# Patient Record
Sex: MALE | Race: WHITE | URBAN - METROPOLITAN AREA
[De-identification: names, ages, dates, MRNs, and addresses within clinical notes are randomized per-mention and may not be internally consistent; named-entity substitution may affect disease eponyms.]

---

## 2019-03-15 ENCOUNTER — EMERGENCY (EMERGENCY)
Facility: HOSPITAL | Age: 54
LOS: 0 days | Discharge: HOME | End: 2019-03-15
Attending: EMERGENCY MEDICINE | Admitting: EMERGENCY MEDICINE
Payer: COMMERCIAL

## 2019-03-15 VITALS — WEIGHT: 259.93 LBS | HEIGHT: 70 IN

## 2019-03-15 VITALS
OXYGEN SATURATION: 99 % | RESPIRATION RATE: 19 BRPM | HEART RATE: 89 BPM | SYSTOLIC BLOOD PRESSURE: 143 MMHG | DIASTOLIC BLOOD PRESSURE: 84 MMHG | TEMPERATURE: 98 F

## 2019-03-15 DIAGNOSIS — Z87.891 PERSONAL HISTORY OF NICOTINE DEPENDENCE: ICD-10-CM

## 2019-03-15 DIAGNOSIS — Z80.0 FAMILY HISTORY OF MALIGNANT NEOPLASM OF DIGESTIVE ORGANS: ICD-10-CM

## 2019-03-15 DIAGNOSIS — R10.12 LEFT UPPER QUADRANT PAIN: ICD-10-CM

## 2019-03-15 DIAGNOSIS — K56.609 UNSPECIFIED INTESTINAL OBSTRUCTION, UNSPECIFIED AS TO PARTIAL VERSUS COMPLETE OBSTRUCTION: ICD-10-CM

## 2019-03-15 DIAGNOSIS — Z79.84 LONG TERM (CURRENT) USE OF ORAL HYPOGLYCEMIC DRUGS: ICD-10-CM

## 2019-03-15 DIAGNOSIS — K57.90 DIVERTICULOSIS OF INTESTINE, PART UNSPECIFIED, WITHOUT PERFORATION OR ABSCESS WITHOUT BLEEDING: ICD-10-CM

## 2019-03-15 DIAGNOSIS — E11.9 TYPE 2 DIABETES MELLITUS WITHOUT COMPLICATIONS: ICD-10-CM

## 2019-03-15 LAB
ALBUMIN SERPL ELPH-MCNC: 3.9 G/DL — SIGNIFICANT CHANGE UP (ref 3.3–5)
ALP SERPL-CCNC: 76 U/L — SIGNIFICANT CHANGE UP (ref 40–120)
ALT FLD-CCNC: 44 U/L — SIGNIFICANT CHANGE UP (ref 12–78)
ANION GAP SERPL CALC-SCNC: 8 MMOL/L — SIGNIFICANT CHANGE UP (ref 5–17)
APTT BLD: 30.1 SEC — SIGNIFICANT CHANGE UP (ref 27.5–36.3)
AST SERPL-CCNC: 28 U/L — SIGNIFICANT CHANGE UP (ref 15–37)
BASOPHILS # BLD AUTO: 0.03 K/UL — SIGNIFICANT CHANGE UP (ref 0–0.2)
BASOPHILS NFR BLD AUTO: 0.3 % — SIGNIFICANT CHANGE UP (ref 0–2)
BILIRUB SERPL-MCNC: 0.9 MG/DL — SIGNIFICANT CHANGE UP (ref 0.2–1.2)
BUN SERPL-MCNC: 12 MG/DL — SIGNIFICANT CHANGE UP (ref 7–23)
CALCIUM SERPL-MCNC: 9.2 MG/DL — SIGNIFICANT CHANGE UP (ref 8.5–10.1)
CHLORIDE SERPL-SCNC: 107 MMOL/L — SIGNIFICANT CHANGE UP (ref 96–108)
CO2 SERPL-SCNC: 21 MMOL/L — LOW (ref 22–31)
CREAT SERPL-MCNC: 1.03 MG/DL — SIGNIFICANT CHANGE UP (ref 0.5–1.3)
EOSINOPHIL # BLD AUTO: 0.04 K/UL — SIGNIFICANT CHANGE UP (ref 0–0.5)
EOSINOPHIL NFR BLD AUTO: 0.3 % — SIGNIFICANT CHANGE UP (ref 0–6)
GLUCOSE SERPL-MCNC: 133 MG/DL — HIGH (ref 70–99)
HCT VFR BLD CALC: 46.8 % — SIGNIFICANT CHANGE UP (ref 39–50)
HGB BLD-MCNC: 16.6 G/DL — SIGNIFICANT CHANGE UP (ref 13–17)
IMM GRANULOCYTES NFR BLD AUTO: 0.3 % — SIGNIFICANT CHANGE UP (ref 0–1.5)
INR BLD: 1.06 RATIO — SIGNIFICANT CHANGE UP (ref 0.88–1.16)
LIDOCAIN IGE QN: 163 U/L — SIGNIFICANT CHANGE UP (ref 73–393)
LYMPHOCYTES # BLD AUTO: 2.79 K/UL — SIGNIFICANT CHANGE UP (ref 1–3.3)
LYMPHOCYTES # BLD AUTO: 24.2 % — SIGNIFICANT CHANGE UP (ref 13–44)
MCHC RBC-ENTMCNC: 30.2 PG — SIGNIFICANT CHANGE UP (ref 27–34)
MCHC RBC-ENTMCNC: 35.5 GM/DL — SIGNIFICANT CHANGE UP (ref 32–36)
MCV RBC AUTO: 85.2 FL — SIGNIFICANT CHANGE UP (ref 80–100)
MONOCYTES # BLD AUTO: 0.98 K/UL — HIGH (ref 0–0.9)
MONOCYTES NFR BLD AUTO: 8.5 % — SIGNIFICANT CHANGE UP (ref 2–14)
NEUTROPHILS # BLD AUTO: 7.67 K/UL — HIGH (ref 1.8–7.4)
NEUTROPHILS NFR BLD AUTO: 66.4 % — SIGNIFICANT CHANGE UP (ref 43–77)
NRBC # BLD: 0 /100 WBCS — SIGNIFICANT CHANGE UP (ref 0–0)
PLATELET # BLD AUTO: 321 K/UL — SIGNIFICANT CHANGE UP (ref 150–400)
POTASSIUM SERPL-MCNC: 4.1 MMOL/L — SIGNIFICANT CHANGE UP (ref 3.5–5.3)
POTASSIUM SERPL-SCNC: 4.1 MMOL/L — SIGNIFICANT CHANGE UP (ref 3.5–5.3)
PROT SERPL-MCNC: 8.1 GM/DL — SIGNIFICANT CHANGE UP (ref 6–8.3)
PROTHROM AB SERPL-ACNC: 11.8 SEC — SIGNIFICANT CHANGE UP (ref 10–12.9)
RBC # BLD: 5.49 M/UL — SIGNIFICANT CHANGE UP (ref 4.2–5.8)
RBC # FLD: 12 % — SIGNIFICANT CHANGE UP (ref 10.3–14.5)
SODIUM SERPL-SCNC: 136 MMOL/L — SIGNIFICANT CHANGE UP (ref 135–145)
WBC # BLD: 11.55 K/UL — HIGH (ref 3.8–10.5)
WBC # FLD AUTO: 11.55 K/UL — HIGH (ref 3.8–10.5)

## 2019-03-15 PROCEDURE — 74177 CT ABD & PELVIS W/CONTRAST: CPT | Mod: 26

## 2019-03-15 PROCEDURE — 99285 EMERGENCY DEPT VISIT HI MDM: CPT

## 2019-03-15 PROCEDURE — 99282 EMERGENCY DEPT VISIT SF MDM: CPT

## 2019-03-15 RX ORDER — FAMOTIDINE 10 MG/ML
20 INJECTION INTRAVENOUS ONCE
Qty: 0 | Refills: 0 | Status: COMPLETED | OUTPATIENT
Start: 2019-03-15 | End: 2019-03-15

## 2019-03-15 RX ORDER — SODIUM CHLORIDE 9 MG/ML
1000 INJECTION INTRAMUSCULAR; INTRAVENOUS; SUBCUTANEOUS ONCE
Qty: 0 | Refills: 0 | Status: COMPLETED | OUTPATIENT
Start: 2019-03-15 | End: 2019-03-15

## 2019-03-15 RX ADMIN — SODIUM CHLORIDE 1000 MILLILITER(S): 9 INJECTION INTRAMUSCULAR; INTRAVENOUS; SUBCUTANEOUS at 18:04

## 2019-03-15 RX ADMIN — FAMOTIDINE 20 MILLIGRAM(S): 10 INJECTION INTRAVENOUS at 18:04

## 2019-03-15 NOTE — ED ADULT NURSE NOTE - NSIMPLEMENTINTERV_GEN_ALL_ED
Implemented All Universal Safety Interventions:  [place] to call system. Call bell, personal items and telephone within reach. Instruct patient to call for assistance. Room bathroom lighting operational. Non-slip footwear when patient is off stretcher. Physically safe environment: no spills, clutter or unnecessary equipment. Stretcher in lowest position, wheels locked, appropriate side rails in place.

## 2019-03-15 NOTE — ED STATDOCS - PROGRESS NOTE DETAILS
52 yo male with a PMH of DM presents with epigastric,LUQ pain since midnight last night, has been constant and causing difficulty sleeping last night. Now intermittent sharp pain. Was seen at urgent care, was told that he had a fast heart rate and to come to the ER. Nothing that alleviates or aggravates the pain. LBM was this morning. Labs, CT, meds, reeval. -Manuel Anne PA-C

## 2019-03-15 NOTE — ED STATDOCS - CLINICAL SUMMARY MEDICAL DECISION MAKING FREE TEXT BOX
Ileus vs partial SBO on CT scan.  Labs WNL.  Discussed with surgery Dr. Marquez.  Surgery team offered NG tube, admission, however patient electing to go home instead and try liquid diet, conservative management, with strict return precautions, which surgery team is okay with at this point given reassuring exam (currently no pain, patient tolerating PO, and had BM today).  Okay for d/c home, strict return precautions given.

## 2019-03-15 NOTE — ED STATDOCS - OBJECTIVE STATEMENT
54 y/o male with a PMHx of DM on Metformin (has not taken his Metformin for 2 weeks), s/p abd hernia surgery presents to the ED c/o sudden onset of LUQ abd pain at midnight last night. Pt reports pain was "sustained" last night, but has since become intermittent. When pain occurs, it is described as "sharp." +Diaphoresis last night, that has since resolved. Pt with x3 unsubstantial BMs from midnight last night to 8:00 this morning. Last BM was this morning. Denies fever, nausea, vomiting, diarrhea. Evaluated by urgent care PTA today and sent after EKG revealed pt was tachycardic. Pt reports recent 10 lb. weight loss. Has been eating more vegetables lately with a decrease in BMs. Former smoker (Quit 01-). Occasional EtOH.

## 2019-03-15 NOTE — ED ADULT NURSE NOTE - OBJECTIVE STATEMENT
c/o constant upper abdominal pain started 12 am, described as sharp pain, did not take medication for pain, resolved no its own, c/o diaphoresis at time, denies fever, vomiting, diarrhea, nausea HX: DM type 2, HTN, high cholesterol,

## 2019-03-15 NOTE — ED STATDOCS - CARE PLAN
Principal Discharge DX:	Abdominal pain Principal Discharge DX:	Abdominal pain  Secondary Diagnosis:	Partial small bowel obstruction

## 2019-03-15 NOTE — CONSULT NOTE ADULT - SUBJECTIVE AND OBJECTIVE BOX
53M with PMH of DM2 who presents with abdominal pain that was acute in onset last night.  Patient admits pain located mostly in epigastrium and now has completely resolved.  Admits 3 bowel movements since episode of acute pain, small.  Denies nausea, vomiting, fevers, chills.  Patient denies having similar pain in the past.  Patient denies colonoscopy in the past.  Patient admits father diagnosed with colon cancer in 80's.    PMH:  DM2  PSH:  Umbilical hernia repair (10 years ago at Outside hospital)  NKDA    PE:  ICU Vital Signs Last 24 Hrs  T(C): 36.8 (15 Mar 2019 18:42), Max: 36.8 (15 Mar 2019 16:39)  T(F): 98.2 (15 Mar 2019 18:42), Max: 98.3 (15 Mar 2019 16:39)  HR: 89 (15 Mar 2019 18:42) (89 - 106)  BP: 143/84 (15 Mar 2019 18:42) (138/99 - 143/84)  BP(mean): --  ABP: --  ABP(mean): --  RR: 19 (15 Mar 2019 18:42) (19 - 20)  SpO2: 99% (15 Mar 2019 18:42) (98% - 99%)    Gen:  NAD, AOX3, Obese abdomen  CV:  s1 s2s2  Pulm:  CTAB  Abd:  Soft, large obese abdomen, nontender, nondistended, well healed transverse supraumbilical scar  Ext:  nonder                          16.6   11.55 )-----------( 321      ( 15 Mar 2019 17:34 )             46.8   03-15    136  |  107  |  12  ----------------------------<  133<H>  4.1   |  21<L>  |  1.03    Ca    9.2      15 Mar 2019 17:34    TPro  8.1  /  Alb  3.9  /  TBili  0.9  /  DBili  x   /  AST  28  /  ALT  44  /  AlkPhos  76  03-15    Impression:    Ileus versus partial low-grade small bowel obstruction as discussed.  Diverticulosis coli without diverticulitis  Small indeterminate hypervascular focus right hepatic lobe. Correlate   with MR

## 2019-03-15 NOTE — PROGRESS NOTE ADULT - ASSESSMENT
H/o abdominal surgeries in the past possible ileus VS SBO , pain resolved, Pt offered observation   pt declined to stay, would like to go home, will report back to ER if symptoms return.

## 2019-03-15 NOTE — ED ADULT NURSE REASSESSMENT NOTE - NS ED NURSE REASSESS COMMENT FT1
as per md jackson- pt ok to go home, ng tube not necessary. pt comfortably, denies pain. ambulatory.
Pt updated on POC and blood test results. Pt given warm blanket. Hourly rounding completed, rn to continue to monitor.

## 2019-03-15 NOTE — PROGRESS NOTE ADULT - SUBJECTIVE AND OBJECTIVE BOX
Pt with abdominal pain now resolved, HD stable  abdominal exam benign, was evaluated by surgical resident was offered observation for SBO VS ileus, I reviewed all labs, and radiological studies  Labs:                          16.6   11.55 )-----------( 321      ( 15 Mar 2019 17:34 )             46.8       03-15    136  |  107  |  12  ----------------------------<  133<H>  4.1   |  21<L>  |  1.03    Ca    9.2      15 Mar 2019 17:34    TPro  8.1  /  Alb  3.9  /  TBili  0.9  /  DBili  x   /  AST  28  /  ALT  44  /  AlkPhos  76  03-15      PT/INR - ( 15 Mar 2019 17:34 )   PT: 11.8 sec;   INR: 1.06 ratio         PTT - ( 15 Mar 2019 17:34 )  PTT:30.1 sec    Vital Signs Last 24 Hrs  T(C): 36.8 (15 Mar 2019 18:42), Max: 36.8 (15 Mar 2019 16:39)  T(F): 98.2 (15 Mar 2019 18:42), Max: 98.3 (15 Mar 2019 16:39)  HR: 89 (15 Mar 2019 18:42) (89 - 106)  BP: 143/84 (15 Mar 2019 18:42) (138/99 - 143/84)  BP(mean): --  RR: 19 (15 Mar 2019 18:42) (19 - 20)  SpO2: 99% (15 Mar 2019 18:42) (98% - 99%)    < from: CT Abdomen and Pelvis w/ IV Cont (03.15.19 @ 20:29) >  Impression:    Ileus versus partial low-grade small bowel obstruction as discussed.  Diverticulosis coli without diverticulitis  Small indeterminate hypervascular focus right hepatic lobe. Correlate   with MR        < end of copied text >

## 2019-03-15 NOTE — CONSULT NOTE ADULT - ASSESSMENT
A/P:  53M with Partial SBO, resolving with benign abdominal exam.  -No acute surgical intervnetion  - NGT decompression, conservative management offered, patient elected to go home, proceed with liquid diet at first and agreed to return to the ER immediately upon return of abdominal pain or obstructive symptoms.      will discuss with attending, Dr. Marquez.

## 2019-05-12 ENCOUNTER — EMERGENCY (EMERGENCY)
Facility: HOSPITAL | Age: 54
LOS: 1 days | Discharge: HOME | End: 2019-05-12
Attending: EMERGENCY MEDICINE
Payer: COMMERCIAL

## 2019-05-12 VITALS
SYSTOLIC BLOOD PRESSURE: 137 MMHG | OXYGEN SATURATION: 98 % | RESPIRATION RATE: 18 BRPM | HEART RATE: 61 BPM | DIASTOLIC BLOOD PRESSURE: 87 MMHG

## 2019-05-12 VITALS
TEMPERATURE: 97 F | WEIGHT: 244.93 LBS | SYSTOLIC BLOOD PRESSURE: 131 MMHG | OXYGEN SATURATION: 97 % | DIASTOLIC BLOOD PRESSURE: 85 MMHG | HEIGHT: 70.5 IN | HEART RATE: 80 BPM | RESPIRATION RATE: 18 BRPM

## 2019-05-12 LAB
ALBUMIN SERPL ELPH-MCNC: 4.3 G/DL — SIGNIFICANT CHANGE UP (ref 3.3–5)
ALP SERPL-CCNC: 79 U/L — SIGNIFICANT CHANGE UP (ref 40–120)
ALT FLD-CCNC: 17 U/L — SIGNIFICANT CHANGE UP (ref 10–45)
ANION GAP SERPL CALC-SCNC: 14 MMOL/L — SIGNIFICANT CHANGE UP (ref 5–17)
APPEARANCE UR: CLEAR — SIGNIFICANT CHANGE UP
AST SERPL-CCNC: 13 U/L — SIGNIFICANT CHANGE UP (ref 10–40)
BACTERIA # UR AUTO: 0 — SIGNIFICANT CHANGE UP
BASE EXCESS BLDV CALC-SCNC: 2.2 MMOL/L — HIGH (ref -2–2)
BASOPHILS # BLD AUTO: 0 K/UL — SIGNIFICANT CHANGE UP (ref 0–0.2)
BASOPHILS NFR BLD AUTO: 0.3 % — SIGNIFICANT CHANGE UP (ref 0–2)
BILIRUB SERPL-MCNC: 0.7 MG/DL — SIGNIFICANT CHANGE UP (ref 0.2–1.2)
BILIRUB UR-MCNC: NEGATIVE — SIGNIFICANT CHANGE UP
BUN SERPL-MCNC: 16 MG/DL — SIGNIFICANT CHANGE UP (ref 7–23)
CA-I SERPL-SCNC: 1.19 MMOL/L — SIGNIFICANT CHANGE UP (ref 1.12–1.3)
CALCIUM SERPL-MCNC: 9.7 MG/DL — SIGNIFICANT CHANGE UP (ref 8.4–10.5)
CHLORIDE BLDV-SCNC: 109 MMOL/L — HIGH (ref 96–108)
CHLORIDE SERPL-SCNC: 102 MMOL/L — SIGNIFICANT CHANGE UP (ref 96–108)
CO2 BLDV-SCNC: 30 MMOL/L — SIGNIFICANT CHANGE UP (ref 22–30)
CO2 SERPL-SCNC: 23 MMOL/L — SIGNIFICANT CHANGE UP (ref 22–31)
COLOR SPEC: SIGNIFICANT CHANGE UP
CREAT SERPL-MCNC: 1.24 MG/DL — SIGNIFICANT CHANGE UP (ref 0.5–1.3)
DIFF PNL FLD: ABNORMAL
EOSINOPHIL # BLD AUTO: 0.1 K/UL — SIGNIFICANT CHANGE UP (ref 0–0.5)
EOSINOPHIL NFR BLD AUTO: 1.5 % — SIGNIFICANT CHANGE UP (ref 0–6)
EPI CELLS # UR: 1 /HPF — SIGNIFICANT CHANGE UP
GAS PNL BLDV: 137 MMOL/L — SIGNIFICANT CHANGE UP (ref 136–145)
GAS PNL BLDV: SIGNIFICANT CHANGE UP
GLUCOSE BLDV-MCNC: 169 MG/DL — HIGH (ref 70–99)
GLUCOSE SERPL-MCNC: 184 MG/DL — HIGH (ref 70–99)
GLUCOSE UR QL: ABNORMAL
HCO3 BLDV-SCNC: 28 MMOL/L — SIGNIFICANT CHANGE UP (ref 21–29)
HCT VFR BLD CALC: 44.6 % — SIGNIFICANT CHANGE UP (ref 39–50)
HCT VFR BLDA CALC: 50 % — SIGNIFICANT CHANGE UP (ref 39–50)
HGB BLD CALC-MCNC: 16.3 G/DL — SIGNIFICANT CHANGE UP (ref 13–17)
HGB BLD-MCNC: 15.5 G/DL — SIGNIFICANT CHANGE UP (ref 13–17)
HYALINE CASTS # UR AUTO: 3 /LPF — HIGH (ref 0–2)
KETONES UR-MCNC: NEGATIVE — SIGNIFICANT CHANGE UP
LACTATE BLDV-MCNC: 1.4 MMOL/L — SIGNIFICANT CHANGE UP (ref 0.7–2)
LEUKOCYTE ESTERASE UR-ACNC: NEGATIVE — SIGNIFICANT CHANGE UP
LIDOCAIN IGE QN: 33 U/L — SIGNIFICANT CHANGE UP (ref 7–60)
LYMPHOCYTES # BLD AUTO: 2.1 K/UL — SIGNIFICANT CHANGE UP (ref 1–3.3)
LYMPHOCYTES # BLD AUTO: 22.8 % — SIGNIFICANT CHANGE UP (ref 13–44)
MCHC RBC-ENTMCNC: 31.1 PG — SIGNIFICANT CHANGE UP (ref 27–34)
MCHC RBC-ENTMCNC: 34.7 GM/DL — SIGNIFICANT CHANGE UP (ref 32–36)
MCV RBC AUTO: 89.7 FL — SIGNIFICANT CHANGE UP (ref 80–100)
MONOCYTES # BLD AUTO: 0.8 K/UL — SIGNIFICANT CHANGE UP (ref 0–0.9)
MONOCYTES NFR BLD AUTO: 8.8 % — SIGNIFICANT CHANGE UP (ref 2–14)
NEUTROPHILS # BLD AUTO: 6.1 K/UL — SIGNIFICANT CHANGE UP (ref 1.8–7.4)
NEUTROPHILS NFR BLD AUTO: 66.6 % — SIGNIFICANT CHANGE UP (ref 43–77)
NITRITE UR-MCNC: NEGATIVE — SIGNIFICANT CHANGE UP
OTHER CELLS CSF MANUAL: 8 ML/DL — LOW (ref 18–22)
PCO2 BLDV: 52 MMHG — HIGH (ref 35–50)
PH BLDV: 7.36 — SIGNIFICANT CHANGE UP (ref 7.35–7.45)
PH UR: 5.5 — SIGNIFICANT CHANGE UP (ref 5–8)
PLATELET # BLD AUTO: 278 K/UL — SIGNIFICANT CHANGE UP (ref 150–400)
PO2 BLDV: 25 MMHG — SIGNIFICANT CHANGE UP (ref 25–45)
POTASSIUM BLDV-SCNC: 3.5 MMOL/L — SIGNIFICANT CHANGE UP (ref 3.5–5.3)
POTASSIUM SERPL-MCNC: 3.7 MMOL/L — SIGNIFICANT CHANGE UP (ref 3.5–5.3)
POTASSIUM SERPL-SCNC: 3.7 MMOL/L — SIGNIFICANT CHANGE UP (ref 3.5–5.3)
PROT SERPL-MCNC: 7.4 G/DL — SIGNIFICANT CHANGE UP (ref 6–8.3)
PROT UR-MCNC: ABNORMAL
RBC # BLD: 4.97 M/UL — SIGNIFICANT CHANGE UP (ref 4.2–5.8)
RBC # FLD: 11.5 % — SIGNIFICANT CHANGE UP (ref 10.3–14.5)
RBC CASTS # UR COMP ASSIST: 3 /HPF — SIGNIFICANT CHANGE UP (ref 0–4)
SAO2 % BLDV: 36 % — LOW (ref 67–88)
SODIUM SERPL-SCNC: 139 MMOL/L — SIGNIFICANT CHANGE UP (ref 135–145)
SP GR SPEC: 1.02 — SIGNIFICANT CHANGE UP (ref 1.01–1.02)
UROBILINOGEN FLD QL: NEGATIVE — SIGNIFICANT CHANGE UP
WBC # BLD: 9.2 K/UL — SIGNIFICANT CHANGE UP (ref 3.8–10.5)
WBC # FLD AUTO: 9.2 K/UL — SIGNIFICANT CHANGE UP (ref 3.8–10.5)
WBC UR QL: 4 /HPF — SIGNIFICANT CHANGE UP (ref 0–5)

## 2019-05-12 PROCEDURE — 84132 ASSAY OF SERUM POTASSIUM: CPT

## 2019-05-12 PROCEDURE — 83690 ASSAY OF LIPASE: CPT

## 2019-05-12 PROCEDURE — 96375 TX/PRO/DX INJ NEW DRUG ADDON: CPT

## 2019-05-12 PROCEDURE — 85014 HEMATOCRIT: CPT

## 2019-05-12 PROCEDURE — 84295 ASSAY OF SERUM SODIUM: CPT

## 2019-05-12 PROCEDURE — 82330 ASSAY OF CALCIUM: CPT

## 2019-05-12 PROCEDURE — 85027 COMPLETE CBC AUTOMATED: CPT

## 2019-05-12 PROCEDURE — 99284 EMERGENCY DEPT VISIT MOD MDM: CPT | Mod: 25

## 2019-05-12 PROCEDURE — 82947 ASSAY GLUCOSE BLOOD QUANT: CPT

## 2019-05-12 PROCEDURE — 96374 THER/PROPH/DIAG INJ IV PUSH: CPT

## 2019-05-12 PROCEDURE — 82803 BLOOD GASES ANY COMBINATION: CPT

## 2019-05-12 PROCEDURE — 83605 ASSAY OF LACTIC ACID: CPT

## 2019-05-12 PROCEDURE — 99284 EMERGENCY DEPT VISIT MOD MDM: CPT

## 2019-05-12 PROCEDURE — 81001 URINALYSIS AUTO W/SCOPE: CPT

## 2019-05-12 PROCEDURE — 82435 ASSAY OF BLOOD CHLORIDE: CPT

## 2019-05-12 PROCEDURE — 80053 COMPREHEN METABOLIC PANEL: CPT

## 2019-05-12 RX ORDER — PANTOPRAZOLE SODIUM 20 MG/1
40 TABLET, DELAYED RELEASE ORAL ONCE
Refills: 0 | Status: COMPLETED | OUTPATIENT
Start: 2019-05-12 | End: 2019-05-12

## 2019-05-12 RX ORDER — SODIUM CHLORIDE 9 MG/ML
1000 INJECTION INTRAMUSCULAR; INTRAVENOUS; SUBCUTANEOUS ONCE
Refills: 0 | Status: COMPLETED | OUTPATIENT
Start: 2019-05-12 | End: 2019-05-12

## 2019-05-12 RX ORDER — OMEPRAZOLE 10 MG/1
1 CAPSULE, DELAYED RELEASE ORAL
Qty: 30 | Refills: 0
Start: 2019-05-12 | End: 2019-06-10

## 2019-05-12 RX ORDER — FAMOTIDINE 10 MG/ML
20 INJECTION INTRAVENOUS ONCE
Refills: 0 | Status: COMPLETED | OUTPATIENT
Start: 2019-05-12 | End: 2019-05-12

## 2019-05-12 RX ORDER — FAMOTIDINE 10 MG/ML
1 INJECTION INTRAVENOUS
Qty: 14 | Refills: 0
Start: 2019-05-12 | End: 2019-05-25

## 2019-05-12 RX ADMIN — FAMOTIDINE 20 MILLIGRAM(S): 10 INJECTION INTRAVENOUS at 14:25

## 2019-05-12 RX ADMIN — SODIUM CHLORIDE 1000 MILLILITER(S): 9 INJECTION INTRAMUSCULAR; INTRAVENOUS; SUBCUTANEOUS at 14:25

## 2019-05-12 RX ADMIN — Medication 30 MILLILITER(S): at 14:25

## 2019-05-12 RX ADMIN — PANTOPRAZOLE SODIUM 40 MILLIGRAM(S): 20 TABLET, DELAYED RELEASE ORAL at 14:25

## 2019-05-12 NOTE — ED ADULT NURSE NOTE - OBJECTIVE STATEMENT
53 year old A&Ox3 male presents ambulatory with steady coordinated gait complaining of abdominal pain since March. Patient states pain has been intermittent but worsened today prompting him to come to ED today. Patient seen at [facility] ED for same complaint, states " the CT showed a possible  obstruction." Pain is epigastric radiating to left midaxillary line. Denies abdominal distention and denies association with food. Confirms decrease in PO intake and decrease in BMs. Denies CP, diaphoresis, SOB, n/v/d, fevers, chills, urinary symptoms, CVA, tenderness, numbness, tingling in upper and lower extremities, HA, blurry vision. VSS updated on plan of care.

## 2019-05-12 NOTE — ED PROVIDER NOTE - CLINICAL SUMMARY MEDICAL DECISION MAKING FREE TEXT BOX
53M hx abdominal surgery w/ ?subsequent low grade sbo on CT last month, p/w continued intermittent migratory abdominal pain. +TTP in epigastrum and left abdomen. Otherwise well appearing with stable vital signs. Plan: labs, lipase, ctap, reassess. 53M hx abdominal surgery w/ ?subsequent low grade sbo on CT last month, p/w continued intermittent migratory abdominal pain. +TTP in epigastrum and left abdomen. Otherwise well appearing with stable vital signs. Plan: labs, lipase, ctap, reassess.  evelina intermitt abs pain since march ct with ileus vs partial sbo but dc with nml bm -- neg biliary sono last week - pasin epigastic pt w inc doseof diclofenac over last feew months - concern for gastriits v pud -- ck lfts and lipase if neg ppi, [id number] and fluids and fu outpt gi for endoscopy which pt has appt for -- abd soft nt no imaging at this point

## 2019-05-12 NOTE — ED PROVIDER NOTE - OBJECTIVE STATEMENT
53M hx ?umbilical hernia s/p repair 10+ years ago, obesity, hld, p/w abdominal pain as bad as 8/10 this AM. Pt reports having this abdominal pain on/off for over a month, was seen in [facility] ED last month for it, underwent CTAP w/ contrast which revealed low grade SBO vs ileus and diverticulosis without diverticulitis. Pt reports pushing to go home on day of that visit and the staff eventually allowed him to even though they were considering potential admission to surgery service. Pt denies f/c, cp/sob, n/v. Endorses mild constipation and lack of appetitew.

## 2019-05-12 NOTE — ED POST DISCHARGE NOTE - OTHER COMMUNICATION
5/12/19: pt called, states he was supposed to be prescribed 3 medications but none were sent to the pharmacy. Chart reviewed, rx for famotidine, maalox, and omeprazole left unsubmitted, no pharmacy in system. Confirmed pharmacy with pt and re-sent rx's as initially prescribed. Pt aware. - Richar Khan PA-C

## 2019-05-12 NOTE — SBIRT NOTE. - NSSBIRTSERVICES_GEN_A_ED_FT
Provided SBIRT Services: Full Screen Negative    Positive reinforcement provided given patient currently within healthy guidelines.  Education materials reviewed and patient declined resources at this time.    AUDIT Score- 4

## 2019-05-12 NOTE — ED ADULT NURSE NOTE - NSIMPLEMENTINTERV_GEN_ALL_ED
Implemented All Universal Safety Interventions:  [place] to call system. Call bell, personal items and telephone within reach. Instruct patient to call for assistance. Room bathroom lighting operational. Non-slip footwear when patient is off stretcher. Physically safe environment: no spills, clutter or unnecessary equipment. Stretcher in lowest position, wheels locked, appropriate side rails in place.

## 2019-05-14 PROBLEM — E11.9 TYPE 2 DIABETES MELLITUS WITHOUT COMPLICATIONS: Chronic | Status: ACTIVE | Noted: 2019-03-15

## 2022-08-15 ENCOUNTER — APPOINTMENT (OUTPATIENT)
Dept: ORTHOPEDIC SURGERY | Facility: CLINIC | Age: 57
End: 2022-08-15
Payer: COMMERCIAL

## 2022-08-15 PROBLEM — Z00.00 ENCOUNTER FOR PREVENTIVE HEALTH EXAMINATION: Status: ACTIVE | Noted: 2022-08-15

## 2022-08-15 PROCEDURE — 73030 X-RAY EXAM OF SHOULDER: CPT | Mod: RT

## 2022-08-15 PROCEDURE — 20611 DRAIN/INJ JOINT/BURSA W/US: CPT | Mod: RT

## 2022-08-15 PROCEDURE — 73010 X-RAY EXAM OF SHOULDER BLADE: CPT | Mod: RT

## 2022-08-15 PROCEDURE — J3490M: CUSTOM

## 2022-08-15 PROCEDURE — 99204 OFFICE O/P NEW MOD 45 MIN: CPT | Mod: 25

## 2022-08-15 PROCEDURE — 99203 OFFICE O/P NEW LOW 30 MIN: CPT | Mod: 25

## 2022-08-15 NOTE — ASSESSMENT
[FreeTextEntry1] : Right X-Ray Examination of the SHOULDER (2 views):  No fractures, subluxations or dislocations. Gh deg \par X-Ray Examination of the SCAPULA 1 or 2 views shows: No significant abnormalities.  Acromial spur. \par \par - We discussed their diagnosis and treatment options at length. \par - We will continue conservative treatment with a course of PT, icing, and anti-inflammatory medication.\par - The patient was provided with a prescription to work on scapular strengthening and rotator cuff strengthening.\par - The patient was advised to let pain guide the gradual advancement of activities. \par - We also discussed the possible of a corticosteroid injection in order to help decrease inflammation and pain so that they can perform better therapy.\par - The risks, benefits, and alternatives to corticosteroid injection were reviewed with the patient and they wished to proceed with this treatment course. \par - Follow up as needed in 6 weeks to re-evaluate progress with therapy\par

## 2022-08-15 NOTE — IMAGING
[de-identified] : \par RIGHT SHOULDER\par Inspection: No swelling. \par Palpation: Tenderness is noted at the bicipital groove, anterior and lateral. \par Range of motion: There is pain with range of motion.\par , ER 55, @90ER 90, @90IR 30\par Strength: There is pain and discomfort with strength testing.\par Forward Flexion 4/5. Abduction 4/5.  External Rotation 5-/5 and Internal Rotation 5/5 \par Neurological testings: motor and sensor intact distally.\par Ligament Stability and Special Tests: \par There is positive arc of pain. \par Shoulder apprehension: neg\par Shoulder relocation: neg\par Clarke’s test: pos\par Biceps Active test: neg\par Farmer Labral Shear: neg\par Impingement testing: pos\par Reggie testing: pos\par Whipple: pos\par Cross Body Adduction: neg\par \par

## 2022-08-15 NOTE — HISTORY OF PRESENT ILLNESS
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  worse with shoulder elevation, better at rest.  has tried NSAIDS, activity mod.\par

## 2022-09-06 ENCOUNTER — FORM ENCOUNTER (OUTPATIENT)
Age: 57
End: 2022-09-06

## 2022-09-06 ENCOUNTER — APPOINTMENT (OUTPATIENT)
Dept: ORTHOPEDIC SURGERY | Facility: CLINIC | Age: 57
End: 2022-09-06
Payer: COMMERCIAL

## 2022-09-06 PROCEDURE — J3490M: CUSTOM

## 2022-09-06 PROCEDURE — 99213 OFFICE O/P EST LOW 20 MIN: CPT | Mod: 25

## 2022-09-06 PROCEDURE — 20611 DRAIN/INJ JOINT/BURSA W/US: CPT | Mod: RT

## 2022-09-06 PROCEDURE — 99214 OFFICE O/P EST MOD 30 MIN: CPT | Mod: 25

## 2022-09-06 NOTE — ASSESSMENT
[FreeTextEntry1] : right poss prox bicep tear and slap in additon to RTC injury\par \par - We discussed their diagnosis and treatment options at length. \par - We will continue conservative treatment with a course of PT, icing, and anti-inflammatory medication.\par - We will obtain an MRI to evaluate the integrity of the rotator cuff tissue and possible need for surgery, given their weakness on exam and positive connie testing.\par - Follow up after MRI to discuss results and further discuss treatment options.\par - We also discussed the possible of a corticosteroid injection in order to help decrease inflammation and pain so that they can perform better therapy and they wished to proceed with this treatment course.\par - In the meantime, the patient was advised to apply ice to the area daily, use anti-inflammatory medication, and let pain guide their activities.\par \par

## 2022-09-06 NOTE — IMAGING
[de-identified] : \par RIGHT SHOULDER\par Inspection: No swelling. \par Palpation: Tenderness is noted at the bicipital groove, anterior and lateral. \par Range of motion: There is pain with range of motion.\par , ER 55, @90ER 90, @90IR 30\par Strength: There is pain and discomfort with strength testing.\par Forward Flexion 4/5. Abduction 4/5.  External Rotation 5-/5 and Internal Rotation 5/5 \par Neurological testings: motor and sensor intact distally.\par Ligament Stability and Special Tests: \par There is positive arc of pain. \par Shoulder apprehension: neg\par Shoulder relocation: neg\par Clarke’s test: pos\par Biceps Active test: neg\par Farmer Labral Shear: neg\par Impingement testing: pos\par Reggie testing: pos\par Whipple: pos\par Cross Body Adduction: neg\par \par

## 2022-09-06 NOTE — HISTORY OF PRESENT ILLNESS
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  worse with shoulder elevation, better at rest.  has tried NSAIDS, activity mod.\par \par 9/6/22 - PT with [facility] at [facility] a week but still pain in bicep and lateral shoulder

## 2022-09-07 ENCOUNTER — APPOINTMENT (OUTPATIENT)
Dept: MRI IMAGING | Facility: CLINIC | Age: 57
End: 2022-09-07

## 2022-09-07 PROCEDURE — 73221 MRI JOINT UPR EXTREM W/O DYE: CPT | Mod: RT

## 2022-09-13 ENCOUNTER — APPOINTMENT (OUTPATIENT)
Dept: ORTHOPEDIC SURGERY | Facility: CLINIC | Age: 57
End: 2022-09-13
Payer: COMMERCIAL

## 2022-09-13 PROCEDURE — 99213 OFFICE O/P EST LOW 20 MIN: CPT

## 2022-09-13 PROCEDURE — 99214 OFFICE O/P EST MOD 30 MIN: CPT

## 2022-09-13 NOTE — ASSESSMENT
[FreeTextEntry1] : mri right shoulder 9/7/22 - full thickness tear supra and infra with 3cm retraction along with some atrophy of muscle, high grade partial subscap tear, labral tearing, bciep interastial tear with medial sublux, Gh deg, incidentally seen cyst in subcut tissue post\par \par \par - The patient was advised of the diagnosis.  The natural history of the pathology was explained to the patient in layman's terms.  Several different treatment options were discussed and explained including the risks and benefits of both surgical and non-surgical treatments.  All questions and concerns were answered.\par - given their cont pain and weakness discussed r/b/a to shouulder surgery\par - higher risk failure given GH deg and muscle atrophy, will send for 2nd opinion re RCR vs athro\par - also rec ultrasound for poss sebeacous cyst in subcut tissue \par

## 2022-09-13 NOTE — IMAGING
[de-identified] : \par RIGHT SHOULDER\par Inspection: No swelling. \par Palpation: Tenderness is noted at the bicipital groove, anterior and lateral. \par Range of motion: There is pain with range of motion.\par , ER 55, @90ER 90, @90IR 30\par Strength: There is pain and discomfort with strength testing.\par Forward Flexion 4/5. Abduction 4/5.  External Rotation 5-/5 and Internal Rotation 5/5 \par Neurological testings: motor and sensor intact distally.\par Ligament Stability and Special Tests: \par There is positive arc of pain. \par Shoulder apprehension: neg\par Shoulder relocation: neg\par Clarke’s test: pos\par Biceps Active test: neg\par Farmer Labral Shear: neg\par Impingement testing: pos\par Reggie testing: pos\par Whipple: pos\par Cross Body Adduction: neg\par \par

## 2022-12-06 ENCOUNTER — APPOINTMENT (OUTPATIENT)
Dept: ORTHOPEDIC SURGERY | Facility: CLINIC | Age: 57
End: 2022-12-06

## 2022-12-06 PROCEDURE — 99214 OFFICE O/P EST MOD 30 MIN: CPT | Mod: 25

## 2022-12-06 PROCEDURE — J3490M: CUSTOM

## 2022-12-06 PROCEDURE — 20611 DRAIN/INJ JOINT/BURSA W/US: CPT | Mod: RT

## 2022-12-06 NOTE — ASSESSMENT
[FreeTextEntry1] : mri right shoulder 9/7/22 - full thickness tear supra and infra with 3cm retraction along with some atrophy of muscle, high grade partial subscap tear, labral tearing, bciep interastial tear with medial sublux, Gh deg, incidentally seen cyst in subcut tissue post\par \par \par - The patient was advised of the diagnosis.  The natural history of the pathology was explained to the patient in layman's terms.  Several different treatment options were discussed and explained including the risks and benefits of both surgical and non-surgical treatments.  All questions and concerns were answered.\par - given their cont pain and weakness discussed r/b/a to shouulder surgery\par - higher risk failure given GH deg and muscle atrophy, will send for 2nd opinion re RCR vs athro\par - We also discussed the possible of a corticosteroid injection in order to help decrease inflammation and pain so that they can perform better therapy and they wished to proceed with this treatment course.\par - Fu with joint replacement specialist \par \par \par

## 2022-12-06 NOTE — IMAGING
[de-identified] : \par RIGHT SHOULDER\par Inspection: No swelling. \par Palpation: Tenderness is noted at the bicipital groove, anterior and lateral. \par Range of motion: There is pain with range of motion.\par , ER 55, @90ER 90, @90IR 30\par Strength: There is pain and discomfort with strength testing.\par Forward Flexion 4/5. Abduction 4/5.  External Rotation 5-/5 and Internal Rotation 5/5 \par Neurological testings: motor and sensor intact distally.\par Ligament Stability and Special Tests: \par There is positive arc of pain. \par Shoulder apprehension: neg\par Shoulder relocation: neg\par Clarke’s test: pos\par Biceps Active test: neg\par Farmer Labral Shear: neg\par Impingement testing: pos\par Reggie testing: pos\par Whipple: pos\par Cross Body Adduction: neg\par \par

## 2022-12-15 ENCOUNTER — APPOINTMENT (OUTPATIENT)
Dept: ORTHOPEDIC SURGERY | Facility: CLINIC | Age: 57
End: 2022-12-15

## 2023-04-25 ENCOUNTER — APPOINTMENT (OUTPATIENT)
Dept: ORTHOPEDIC SURGERY | Facility: CLINIC | Age: 58
End: 2023-04-25
Payer: COMMERCIAL

## 2023-04-25 PROCEDURE — J3490M: CUSTOM

## 2023-04-25 PROCEDURE — 20611 DRAIN/INJ JOINT/BURSA W/US: CPT | Mod: RT

## 2023-04-25 PROCEDURE — 99214 OFFICE O/P EST MOD 30 MIN: CPT | Mod: 25

## 2023-04-25 NOTE — IMAGING
[de-identified] : \par RIGHT SHOULDER\par Inspection: No swelling. \par Palpation: Tenderness is noted at the bicipital groove, anterior and lateral. \par Range of motion: There is pain with range of motion.\par , ER 55, @90ER 90, @90IR 30\par Strength: There is pain and discomfort with strength testing.\par Forward Flexion 4/5. Abduction 4/5.  External Rotation 5-/5 and Internal Rotation 5/5 \par Neurological testings: motor and sensor intact distally.\par Ligament Stability and Special Tests: \par There is positive arc of pain. \par Shoulder apprehension: neg\par Shoulder relocation: neg\par Clarke’s test: pos\par Biceps Active test: neg\par Farmer Labral Shear: neg\par Impingement testing: pos\par Reggie testing: pos\par Whipple: pos\par Cross Body Adduction: neg\par \par

## 2023-04-25 NOTE — PROCEDURE
6 [FreeTextEntry3] : \par Injection Procedure Note:\par \par The risks, benefits, and alternatives to corticosteroid injection were reviewed with the patient.  Risks outlined include but are not limited to infection, sepsis, bleeding, scarring, skin discoloration, temporary increase in pain, syncopal episode, failure to resolve symptoms, symptoms recurrence, allergic reaction, flare reaction, and elevation of blood sugar in diabetics.  Patient understood the risks and asked to proceed with this treatment course.\par \par Patient Identification\par Name/: Verbal with patient and/or family\par \par Procedure Verification:\par Procedure confirmed with patient or family/designee\par Consent for procedure: Verbal Consent Given\par Relevant documentation completed, reviewed, and signed\par Clinical indications for procedure confirmed\par \par Time-out with all members of procedure team immediately prior to procedure:\par Correct patient identified. Agreement on procedure. Correct side and site.\par \par ULTRASOUND GUIDED SHOULDER GLENOHUMERAL INJECTION - RIGHT \par After verbal consent and identification of the correct patient and correct site, the posterior right shoulder was prepped using alcohol swabs and betadine. This was allowed time to air dry. After ethyl choride spray for skin anesthesia, a mixture of 1cc DepoMedrol 40mg/ml, 3cc Lidocaine 1%, and 3cc Bupivacaine 0.5% was injected under ultrasound guidance into the glenohumeral joint from posterior using a sterile 22G needle. Visualization of the needle and placement of the injection was performed without any complications.  The patient tolerated the procedure well. After-care instructions were provided and included instructions to ice the area and to call if redness, pain, or fever develop.\par

## 2023-04-25 NOTE — HISTORY OF PRESENT ILLNESS
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  worse with shoulder elevation, better at rest.  has tried NSAIDS, activity mod.\par \par 9/6/22 - PT with J[facility] at [facility] 3X a week but still pain in bicep and lateral shoulder, had CSI\par 9/13/22 - had mri showing full RTC tear along with gh deg and some atorphy, still pain and weakness\par 12/6/22-  R shoulder CSI (8/15/22) some improvement with PT and HEP with [facility] at [facility]\par 4/25/23 - here for f/up right shoulder. no significant improvement since last visit. CSI (12/6/22)  provided some relief

## 2023-05-11 ENCOUNTER — APPOINTMENT (OUTPATIENT)
Dept: ORTHOPEDIC SURGERY | Facility: CLINIC | Age: 58
End: 2023-05-11
Payer: COMMERCIAL

## 2023-05-11 VITALS — WEIGHT: 190 LBS | BODY MASS INDEX: 27.2 KG/M2 | HEIGHT: 70 IN

## 2023-05-11 DIAGNOSIS — E11.9 TYPE 2 DIABETES MELLITUS W/OUT COMPLICATIONS: ICD-10-CM

## 2023-05-11 DIAGNOSIS — Z78.9 OTHER SPECIFIED HEALTH STATUS: ICD-10-CM

## 2023-05-11 PROCEDURE — 99214 OFFICE O/P EST MOD 30 MIN: CPT

## 2023-05-16 PROBLEM — Z78.9 NO PERTINENT FAMILY HISTORY: Status: ACTIVE | Noted: 2023-05-11

## 2023-05-16 NOTE — DATA REVIEWED
[FreeTextEntry1] : MRI of the Right shoulder [facility] on 09/07/2022\par Impression: \par 1. AC joint arthrosis with lateral acromial spur. Narrowing of the humeroacromial interval, which c\par with impingement.\par 2. Full-thickness insertional tear of the supraspinatus and infraspinatus with retraction and atrophy \par 3. Full-thickness insertional tear of the superior two-third of the subscapularis retracted by 10 mm.\par 4. Medial dislocation and biceps tendinopathy with interstitial tear at the anchor. Tenosynovitis.\par 5. Tear of the superior labrum and posterior inferior labrum with degeneration of the anterior inferi\par 6. Glenohumeral arthrosis with joint effusion.

## 2023-05-16 NOTE — DISCUSSION/SUMMARY
[de-identified] : Assessment: The patient is a 57 year old male with complete tear of right rotator cuff and arthritis of right glenohumeral joint. \par \par Patient and I discussed their symptoms. Discussed findings of today's exam and possible causes of patient's pain. Educated patient on their most probable diagnosis. Reviewed possible courses of treatment, and we collaboratively decided best course of treatment at this time will include:\par \par 1. Discussed right RTC repair, r/b/a - possible surgery in September \par 2. Will follow up in mid-july for a repeat CSI\par \par The patient's current medication management of their orthopedic diagnosis was reviewed today: \par \par (1) We discussed a comprehensive treatment plan that included possible pharmaceutical management involving the use of prescription strength medications including but not limited to options such as oral Naprosyn 500mg BID, once daily Meloxicam 15 mg, or 500-650 mg Tylenol versus over the counter oral medications and topical prescription NSAID Pennsaid vs over the counter Voltaren gel. \par \par (2) There is a moderate risk of morbidity with further treatment, especially from use of prescription strength medications and possible side effects of these medications which include upset stomach with oral medications, skin reactions to topical medications and cardiac/renal issues with long term use. \par \par (3) I recommended that the patient follow-up with their medical physician to discuss any significant specific potential issues with long term medication use such as interactions with current medications or with exacerbation of underlying medical comorbidities. \par \par (4) The benefits and risks associated with use of injectable, oral or topical, prescription and over the counter anti-inflammatory medications were discussed with the patient. The patient voiced understanding of the risks including but not limited to bleeding, stroke, kidney dysfunction, heart disease, and were referred to the black box warning label for further information.\par \par Prior to appointment and during encounter with patient extensive medical records were reviewed including but not limited to, hospital records, out patient records, imaging results, and lab data. During this appointment the patient was examined, diagnoses were discussed and explained in a face to face manner. In addition extensive time was spent reviewing aforementioned diagnostic studies. Counseling including abnormal image results, differential diagnoses, treatment options, risk and benefits, lifestyle changes, current condition, and current medications was performed. Patient's comments, questions, and concerns were address and patient verbalized understanding.

## 2023-05-16 NOTE — IMAGING
[de-identified] : The patient is a well appearing 57 year male of their stated age.\par Neck is supple & nontender to palpation. Negative Spurling's test.\par \par General: in no acute distress, seated comfortably, moving easily\par Skin: No discoloration, rashes; on palpation skin is dry, \par Neuro: Normal sensation all dermatomes, motor all myotomes\par Vascular: Normal pulses, no edema, normal temperature\par Coordination and balance: Normal\par Psych: normal mood and affect, non pressured speech, alert and oriented x3\par \par Effected Shoulder \par Inspection:\par Scapula Winging: Negative\par Deformity: None\par Erythema: None\par Ecchymosis: None\par Abrasions: None\par Effusion: None\par \par Range of Motion:\par Active Forward Flexion: 120 degrees \par Passive Forward Flexion: 170 degrees \par Active IR : sacrum \par Passive ER : 15 degrees\par \par Motor Exam:\par Forward Flexion: 4+ out of 5\par Flexion Plane of Scapula: 5 out of 5\par Abduction: 4+ out of 5\par Internal Rotation: 5 out of 5\par External Rotation: 4+ out of 5\par Distal Motor Strength: 5 out of 5\par \par Stability Testing:\par Anterior: 1+\par Posterior: 1+\par Sulcus N: 1+\par Sulcus ER: 1+\par \par Provocative Tests:\par Drop Arm: Negative\par Bee/Impingement: Positive\par Bennett: Positive\par X-Arm Adduction: Negative\par Belly Press: Negative\par Bear Hug: Negative\par Lift Off: Negative\par Apprehension: Negative\par Relocation: Negative\par Posterior Load & Shift: Negative\par \par Palpation:\par AC Joint: Nontender\par Clavicle: Nontender\par SC Joint: Nontender\par Bicepital Groove: Positive\par Coracoid Process: Nontender\par Pectoralis Minor Tendon: Nontender\par Pectoralis Major Tendon: Nontender & palpably intact\par Latissimus Dorsi: Nontender \par Proximal Humerus: Positive\par Scapula Body: Nontender\par Medial Scapula Boarder: Nontender\par Scapula Spine: Nontender\par \par Neurologic Exam: Sensation to Light Touch:\par Axillary: Grossly intact\par Ulnar: Grossly intact\par Radial: Grossly intact\par Median: Grossly intact\par Other:  N/A\par \par Circulatory/Pulses:\par Ulnar: 2+\par Radial: 2+\par Other Pertinent Findings: None\par \par Contralateral Shoulder\par Range of Motion:\par Active Forward Flexion: 180 degrees \par Active Abduction: 180 degrees \par Passive Forward Flexion: 180 degrees \par Passive Abduction: 180 degrees \par ER @ 90 degrees: 90 degrees\par IR @ 90 degrees: 45 degrees\par ER @ 0 degrees: 50 degrees\par \par Motor Exam:\par Forward Flexion: 5 out of 5\par Flexion Plane of Scapula: 5 out of 5\par Abduction: 5 out of 5\par Internal Rotation: 5 out of 5\par External Rotation: 5 out of 5\par Distal Motor Strength: 5 out of 5\par \par Stability Testing:\par Anterior: 1+\par Posterior: 1+\par Sulcus N: 1+\par Sulcus ER: 1+\par \par Other Pertinent Findings: None\par

## 2023-05-16 NOTE — HISTORY OF PRESENT ILLNESS
[de-identified] : The patient is a 57 year old [left] hand dominant male who presents today complaining of RT shoulder pain. CSI provides short-term relief. Diabetes is controlled with medication, A1C - 5. PT provided no relief. Likes to play pickle ball and LOVES golf. Wants to be able to play golf without pain. Eager to receive CSI since it provides significant relief. \par Date of Injury/Onset:  chronic, last august\par Pain:    At Rest:  0/10 \par With Activity:   6-7/10 \par Mechanism of injury:  carrying a suitcase down the stairs and popped the shoulder when the suitcase fell from under him\par This is [NOT]  a Work Related Injury being treated under Worker's Compensation.\par This is [NOT] an athletic injury occurring associated with an interscholastic or organized sports team.\par Quality of symptoms:  throbbing ache\par Improves with:  injections, meds\par Worse with:  sleeping on it\par Prior treatment:  Dr Chaney\par Prior Imaging: [facility] MRI\par Out of work/sport: [ YES/NO ] , since [ DATE OF INJURY ] \par School/Sport/Position/Occupation: Sales - usually driving \par Additional Information: None\par \par

## 2023-07-27 ENCOUNTER — APPOINTMENT (OUTPATIENT)
Dept: ORTHOPEDIC SURGERY | Facility: CLINIC | Age: 58
End: 2023-07-27
Payer: COMMERCIAL

## 2023-07-27 DIAGNOSIS — M19.011 PRIMARY OSTEOARTHRITIS, RIGHT SHOULDER: ICD-10-CM

## 2023-07-27 DIAGNOSIS — S46.101A UNSPECIFIED INJURY OF MUSCLE, FASCIA AND TENDON OF LONG HEAD OF BICEPS, RIGHT ARM, INITIAL ENCOUNTER: ICD-10-CM

## 2023-07-27 DIAGNOSIS — M75.41 IMPINGEMENT SYNDROME OF RIGHT SHOULDER: ICD-10-CM

## 2023-07-27 DIAGNOSIS — S43.431A SUPERIOR GLENOID LABRUM LESION OF RIGHT SHOULDER, INITIAL ENCOUNTER: ICD-10-CM

## 2023-07-27 DIAGNOSIS — M75.121 COMPLETE ROTATOR CUFF TEAR OR RUPTURE OF RIGHT SHOULDER, NOT SPECIFIED AS TRAUMATIC: ICD-10-CM

## 2023-07-27 PROCEDURE — 99214 OFFICE O/P EST MOD 30 MIN: CPT | Mod: 25

## 2023-07-27 PROCEDURE — 20611 DRAIN/INJ JOINT/BURSA W/US: CPT | Mod: RT

## 2023-07-27 PROCEDURE — J3490M: CUSTOM

## 2023-07-27 NOTE — HISTORY OF PRESENT ILLNESS
[de-identified] : 07/27/23: ENOC is presenting today for a follow up for RT Shoulder Pain\par Improves With: CSI \par Worse With: Activities/Sleeping\par Quality of Symptoms: Throbbing/Achy\par Treatment: CSI and PT\par Additional Information: A1C - 5, Diabetes controlled with medication \par

## 2023-07-27 NOTE — DISCUSSION/SUMMARY
[de-identified] : Assessment: The patient is a 57 year old male with complete tear of right rotator cuff and arthritis of glenohumeral joint..\par \par Patient and I discussed their symptoms. Discussed findings of today's exam and possible causes of patient's pain. Educated patient on their most probable diagnosis. Reviewed possible courses of treatment, and we collaboratively decided best course of treatment at this time will include:\par \par 1. CSI Injection was given today in office, patient tolerated injection well\par 2. Apply ice to effected area, as needed\par 3. Plan for Surgery Late September \par \par  Plan: Patient understands that He is a candidate for Subacromial balloon arthroplasty.  Discussed Repair vs. TSA vs. Subacromial balloon arthroplasty. Discussed non-operative and operative treatment options including []. All questions and concerns regarding the surgery were addressed. Went over the recovery timeline and expected outcomes following surgery. Patient elected to move forward with the surgical procedure.\par \par Tests Ordered: Post-op and COVID \par Prescription Medications Ordered: [] \par Braces/DME Ordered: [] \par Activity/Work/Sports Status: [] \par Additional Instructions: [] \par Follow-Up: 2 weeks post-op \par \par The patient's current medication management of their orthopedic diagnosis was reviewed today:\par (1) We discussed a comprehensive treatment plan that included possible pharmaceutical management involving the use of prescription strength medications including but not limited to options such as oral Naprosyn 500mg BID, once daily Meloxicam 15 mg, or 500-650 mg Tylenol versus over the counter oral medications and topical prescription NSAID Pennsaid vs over the counter Voltaren gel.\par \par (2) There is a moderate risk of morbidity with further treatment, especially from use of prescription strength medications and possible side effects of these medications which include upset stomach with oral medications, skin reactions to topical medications and cardiac/renal issues with long term use.\par \par (3) I recommended that the patient follow-up with their medical physician to discuss any significant specific potential issues with long term medication use such as interactions with current medications or with exacerbation of underlying medical comorbidities.\par \par (4) The benefits and risks associated with use of injectable, oral or topical, prescription and over the counter anti-inflammatory medications were discussed with the patient. The patient voiced understanding of the risks including but not limited to bleeding, stroke, kidney dysfunction, heart disease, and were referred to the black box warning label for further information.\par \par Consent:  Conservative treatment, nontreatment, nonsurgical intervention and surgical intervention treatment options have been reviewed with the patient.  The patient continues to be symptomatic and has failed conservative treatment, and elects to move forward with surgical intervention.  The patient is indicated for [the above procedure] and all indicated procedures. As such the alternatives, benefits and risks, of the above procedure, including but not limited to bleeding, infection, neurovascular injury, loss of limb, loss of life,  DVT, PE, RSD, inability to return to previous level of activity, inability to return to previous level of employment, advancement of or to osteoarthritic changes, joint instability or motion loss, hardware failure or migration, malunion or nonunion, failure to resolve all symptoms, failure to return to sports and need for further procedures were discussed with the patient and/or their legal guardian who agreed to move forward with surgical intervention.  They have reviewed and signed the consent form today after expressing understanding of the above documented conversation. The patient or their representative will contact my office as instructed on the preoperative instruction sheet they received today to schedule surgery in a timely manner as discussed.\par

## 2023-07-27 NOTE — PROCEDURE
[FreeTextEntry3] : Patient Identification \par Name/: Verbal with patient and/or family \par  Procedure Verification: \par Procedure confirmed with patient or family/designee \par Consent for procedure: Verbal Consent Given \par Relevant documentation completed, reviewed, and signed \par Clinical indications for procedure confirmed \par  \par Time-out with all members of procedure team immediately prior to procedure: \par Correct patient identified. Agreement on procedure. Correct side and site. \par  \par ULTRASOUND GUIDED SHOULDER SUBACROMIAL INJECTION - RIGHT \par After verbal consent and identification of the correct patient and correct site, the posterior right shoulder was prepped using alcohol swabs and betadine. This was allowed time to air dry. After ethyl chloride spray for skin anesthesia, a mixture of 1cc Celestone 6mg/ml, 3cc Lidocaine 1%, and 3cc Bupivacaine 0.5% was injected under ultrasound guidance into the subacromial space from posterior using a sterile 22G needle. Visualization of the needle and placement of the injection was performed without any complications. Ultrasound was used for visualization, precise injection in area of tear, and / or prior failure or difficult injection. The patient tolerated the procedure well. After-care instructions were provided and included instructions to ice the area and to call if redness, pain, or fever develop.\par \par The risks, benefits, and alternatives to corticosteroid injection were reviewed with the patient. Risks outlined include but are not limited to infection, sepsis, bleeding, scarring, skin discoloration, temporary increase in pain, syncopal episode, failure to resolve symptoms, symptoms recurrence, allergic reaction, flare reaction, and elevation of blood sugar in diabetics. Patient understood the risks and asked to proceed with this treatment course.\par

## 2023-07-27 NOTE — REVIEW OF SYSTEMS
1.  You were seen in the ENT Clinic today by Dr. Alcantara.  If you have any questions or concerns after your appointment, please call [phone].    2.  Plan is to return to clinic as needed.    Thank you!  Mimi Gasca RN Care Coordinator  [facility]    
[Negative] : Heme/Lymph

## 2023-07-27 NOTE — IMAGING
[de-identified] : The patient is a well appearing 57 year male of their stated age.\par Neck is supple & nontender to palpation. Negative Spurling's test.\par \par General: in no acute distress, seated comfortably, moving easily\par Skin: No discoloration, rashes; on palpation skin is dry, \par Neuro: Normal sensation all dermatomes, motor all myotomes\par Vascular: Normal pulses, no edema, normal temperature\par Coordination and balance: Normal\par Psych: normal mood and affect, non pressured speech, alert and oriented x3\par \par RIGHT Shoulder \par Inspection:\par Scapula Winging: Negative\par Deformity: None\par Erythema: None\par Ecchymosis: None\par Abrasions: None\par Effusion: None\par \par Range of Motion:\par Active Forward Flexion: 120 degrees \par Passive Forward Flexion: 170 degrees \par Active IR : sacrum \par Passive ER : 15 degrees\par \par Motor Exam:\par Forward Flexion: 4+ out of 5\par Flexion Plane of Scapula: 5 out of 5\par Abduction: 4+ out of 5\par Internal Rotation: 5 out of 5\par External Rotation: 4+ out of 5\par Distal Motor Strength: 5 out of 5\par \par Stability Testing:\par Anterior: 1+\par Posterior: 1+\par Sulcus N: 1+\par Sulcus ER: 1+\par \par Provocative Tests:\par Drop Arm: Negative\par Bee/Impingement: Positive\par Kaufman: Positive\par X-Arm Adduction: Negative\par Belly Press: Negative\par Bear Hug: Negative\par Lift Off: Negative\par Apprehension: Negative\par Relocation: Negative\par Posterior Load & Shift: Negative\par \par Palpation:\par AC Joint: Nontender\par Clavicle: Nontender\par SC Joint: Nontender\par Bicepital Groove: Positive\par Coracoid Process: Nontender\par Pectoralis Minor Tendon: Nontender\par Pectoralis Major Tendon: Nontender & palpably intact\par Latissimus Dorsi: Nontender \par Proximal Humerus: Positive\par Scapula Body: Nontender\par Medial Scapula Boarder: Nontender\par Scapula Spine: Nontender\par \par Neurologic Exam: Sensation to Light Touch:\par Axillary: Grossly intact\par Ulnar: Grossly intact\par Radial: Grossly intact\par Median: Grossly intact\par Other:  N/A\par \par Circulatory/Pulses:\par Ulnar: 2+\par Radial: 2+\par Other Pertinent Findings: None\par \par Contralateral Shoulder\par Range of Motion:\par Active Forward Flexion: 180 degrees \par Active Abduction: 180 degrees \par Passive Forward Flexion: 180 degrees \par Passive Abduction: 180 degrees \par ER @ 90 degrees: 90 degrees\par IR @ 90 degrees: 45 degrees\par ER @ 0 degrees: 50 degrees\par \par Motor Exam:\par Forward Flexion: 5 out of 5\par Flexion Plane of Scapula: 5 out of 5\par Abduction: 5 out of 5\par Internal Rotation: 5 out of 5\par External Rotation: 5 out of 5\par Distal Motor Strength: 5 out of 5\par \par Stability Testing:\par Anterior: 1+\par Posterior: 1+\par Sulcus N: 1+\par Sulcus ER: 1+\par \par Other Pertinent Findings: None\par

## 2024-05-14 ENCOUNTER — INPATIENT (INPATIENT)
Facility: HOSPITAL | Age: 59
LOS: 0 days | Discharge: HOME | DRG: 310 | End: 2024-05-15
Attending: INTERNAL MEDICINE | Admitting: STUDENT IN AN ORGANIZED HEALTH CARE EDUCATION/TRAINING PROGRAM
Payer: COMMERCIAL

## 2024-05-14 VITALS
WEIGHT: 218.48 LBS | OXYGEN SATURATION: 100 % | SYSTOLIC BLOOD PRESSURE: 124 MMHG | HEART RATE: 166 BPM | DIASTOLIC BLOOD PRESSURE: 91 MMHG | TEMPERATURE: 98 F | RESPIRATION RATE: 22 BRPM

## 2024-05-14 LAB
ALBUMIN SERPL ELPH-MCNC: 3.6 G/DL — SIGNIFICANT CHANGE UP (ref 3.3–5)
ALP SERPL-CCNC: 66 U/L — SIGNIFICANT CHANGE UP (ref 40–120)
ALT FLD-CCNC: 26 U/L — SIGNIFICANT CHANGE UP (ref 12–78)
ANION GAP SERPL CALC-SCNC: 8 MMOL/L — SIGNIFICANT CHANGE UP (ref 5–17)
APPEARANCE UR: CLEAR — SIGNIFICANT CHANGE UP
APTT BLD: 29 SEC — SIGNIFICANT CHANGE UP (ref 24.5–35.6)
AST SERPL-CCNC: 22 U/L — SIGNIFICANT CHANGE UP (ref 15–37)
BASOPHILS # BLD AUTO: 0.03 K/UL — SIGNIFICANT CHANGE UP (ref 0–0.2)
BASOPHILS NFR BLD AUTO: 0.3 % — SIGNIFICANT CHANGE UP (ref 0–2)
BILIRUB SERPL-MCNC: 0.6 MG/DL — SIGNIFICANT CHANGE UP (ref 0.2–1.2)
BILIRUB UR-MCNC: NEGATIVE — SIGNIFICANT CHANGE UP
BLD GP AB SCN SERPL QL: SIGNIFICANT CHANGE UP
BUN SERPL-MCNC: 21 MG/DL — SIGNIFICANT CHANGE UP (ref 7–23)
CALCIUM SERPL-MCNC: 8.9 MG/DL — SIGNIFICANT CHANGE UP (ref 8.5–10.1)
CHLORIDE SERPL-SCNC: 112 MMOL/L — HIGH (ref 96–108)
CO2 SERPL-SCNC: 20 MMOL/L — LOW (ref 22–31)
COLOR SPEC: YELLOW — SIGNIFICANT CHANGE UP
CREAT SERPL-MCNC: 1.35 MG/DL — HIGH (ref 0.5–1.3)
D DIMER BLD IA.RAPID-MCNC: <150 NG/ML DDU — SIGNIFICANT CHANGE UP
DIFF PNL FLD: NEGATIVE — SIGNIFICANT CHANGE UP
EGFR: 61 ML/MIN/1.73M2 — SIGNIFICANT CHANGE UP
EOSINOPHIL # BLD AUTO: 0.09 K/UL — SIGNIFICANT CHANGE UP (ref 0–0.5)
EOSINOPHIL NFR BLD AUTO: 0.8 % — SIGNIFICANT CHANGE UP (ref 0–6)
GLUCOSE SERPL-MCNC: 107 MG/DL — HIGH (ref 70–99)
GLUCOSE UR QL: NEGATIVE MG/DL — SIGNIFICANT CHANGE UP
HCT VFR BLD CALC: 44.4 % — SIGNIFICANT CHANGE UP (ref 39–50)
HGB BLD-MCNC: 15.6 G/DL — SIGNIFICANT CHANGE UP (ref 13–17)
IMM GRANULOCYTES NFR BLD AUTO: 0.3 % — SIGNIFICANT CHANGE UP (ref 0–0.9)
INR BLD: 0.94 RATIO — SIGNIFICANT CHANGE UP (ref 0.85–1.18)
KETONES UR-MCNC: 15 MG/DL
LEUKOCYTE ESTERASE UR-ACNC: NEGATIVE — SIGNIFICANT CHANGE UP
LYMPHOCYTES # BLD AUTO: 18.1 % — SIGNIFICANT CHANGE UP (ref 13–44)
LYMPHOCYTES # BLD AUTO: 2.02 K/UL — SIGNIFICANT CHANGE UP (ref 1–3.3)
MCHC RBC-ENTMCNC: 30.3 PG — SIGNIFICANT CHANGE UP (ref 27–34)
MCHC RBC-ENTMCNC: 35.1 GM/DL — SIGNIFICANT CHANGE UP (ref 32–36)
MCV RBC AUTO: 86.2 FL — SIGNIFICANT CHANGE UP (ref 80–100)
MONOCYTES # BLD AUTO: 1.12 K/UL — HIGH (ref 0–0.9)
MONOCYTES NFR BLD AUTO: 10 % — SIGNIFICANT CHANGE UP (ref 2–14)
NEUTROPHILS # BLD AUTO: 7.89 K/UL — HIGH (ref 1.8–7.4)
NEUTROPHILS NFR BLD AUTO: 70.5 % — SIGNIFICANT CHANGE UP (ref 43–77)
NITRITE UR-MCNC: NEGATIVE — SIGNIFICANT CHANGE UP
PH UR: 5 — SIGNIFICANT CHANGE UP (ref 5–8)
PLATELET # BLD AUTO: 235 K/UL — SIGNIFICANT CHANGE UP (ref 150–400)
POTASSIUM SERPL-MCNC: 3.6 MMOL/L — SIGNIFICANT CHANGE UP (ref 3.5–5.3)
POTASSIUM SERPL-SCNC: 3.6 MMOL/L — SIGNIFICANT CHANGE UP (ref 3.5–5.3)
PROT SERPL-MCNC: 6.7 GM/DL — SIGNIFICANT CHANGE UP (ref 6–8.3)
PROT UR-MCNC: NEGATIVE MG/DL — SIGNIFICANT CHANGE UP
PROTHROM AB SERPL-ACNC: 10.6 SEC — SIGNIFICANT CHANGE UP (ref 9.5–13)
RBC # BLD: 5.15 M/UL — SIGNIFICANT CHANGE UP (ref 4.2–5.8)
RBC # FLD: 12.3 % — SIGNIFICANT CHANGE UP (ref 10.3–14.5)
SODIUM SERPL-SCNC: 140 MMOL/L — SIGNIFICANT CHANGE UP (ref 135–145)
SP GR SPEC: 1.02 — SIGNIFICANT CHANGE UP (ref 1–1.03)
TROPONIN I, HIGH SENSITIVITY RESULT: 16.36 NG/L — SIGNIFICANT CHANGE UP
TSH SERPL-MCNC: 1.67 UU/ML — SIGNIFICANT CHANGE UP (ref 0.34–4.82)
UROBILINOGEN FLD QL: 0.2 MG/DL — SIGNIFICANT CHANGE UP (ref 0.2–1)
WBC # BLD: 11.18 K/UL — HIGH (ref 3.8–10.5)
WBC # FLD AUTO: 11.18 K/UL — HIGH (ref 3.8–10.5)

## 2024-05-14 PROCEDURE — 93010 ELECTROCARDIOGRAM REPORT: CPT

## 2024-05-14 PROCEDURE — 99291 CRITICAL CARE FIRST HOUR: CPT

## 2024-05-14 PROCEDURE — 71045 X-RAY EXAM CHEST 1 VIEW: CPT | Mod: 26

## 2024-05-14 RX ORDER — SODIUM CHLORIDE 9 MG/ML
1000 INJECTION INTRAMUSCULAR; INTRAVENOUS; SUBCUTANEOUS ONCE
Refills: 0 | Status: COMPLETED | OUTPATIENT
Start: 2024-05-14 | End: 2024-05-14

## 2024-05-14 RX ORDER — ADENOSINE 3 MG/ML
6 INJECTION INTRAVENOUS ONCE
Refills: 0 | Status: COMPLETED | OUTPATIENT
Start: 2024-05-14 | End: 2024-05-14

## 2024-05-14 RX ORDER — METFORMIN HYDROCHLORIDE 850 MG/1
0 TABLET ORAL
Qty: 0 | Refills: 0 | DISCHARGE

## 2024-05-14 RX ADMIN — SODIUM CHLORIDE 1000 MILLILITER(S): 9 INJECTION INTRAMUSCULAR; INTRAVENOUS; SUBCUTANEOUS at 22:48

## 2024-05-14 RX ADMIN — ADENOSINE 6 MILLIGRAM(S): 3 INJECTION INTRAVENOUS at 22:32

## 2024-05-14 RX ADMIN — SODIUM CHLORIDE 1000 MILLILITER(S): 9 INJECTION INTRAMUSCULAR; INTRAVENOUS; SUBCUTANEOUS at 21:48

## 2024-05-14 NOTE — ED ADULT NURSE NOTE - CAS EDN DISCHARGE ASSESSMENT
Patient resting quietly in bed, no S/S of distress, AA&Ox4 but often becomes confused. Patient impulsive ad=nd attempts to get out of bed without calling. Denies SOB, chest pain, dizziness. Bed alarm on, call light within reach. Bed in lowest position, bed locked, RN and CNA numbers provided, no further needs at this time. No changes from EPIC. Hourly rounding in place.     Alert and oriented to person, place and time

## 2024-05-14 NOTE — ED ADULT NURSE NOTE - OBJECTIVE STATEMENT
Pt is 59 yo male ambulatory to ED c/o palpitations and tachycardia. Pt states his watch has told him he has had a fast heart rate since Saturday. Denies chest pain, SOB, n/v/d. Reports having a similar episode roughly a month ago, but symptoms resolved. PMHX DM. 81 mg asprin taken PTA. Pt placed on cardiac monitor.

## 2024-05-14 NOTE — PHARMACOTHERAPY INTERVENTION NOTE - COMMENTS
Medication reconciliation completed.  Reviewed Medication list and confirmed med allergies with patient; confirmed with Dr. First Medlaron.

## 2024-05-14 NOTE — ED ADULT NURSE NOTE - CHIEF COMPLAINT QUOTE
Pt presents to ED c/o palpitations and dizziness. Pt states he was sent from [facility] where they did an EKG on him and showed his HR was in the 170s. Pt HR in triage 179. Denies CP, SOB, or any other  complaints Denies cardiac pmh. Taken for EKG.

## 2024-05-14 NOTE — ED STATDOCS - PROGRESS NOTE DETAILS
Desean Lan for attending Dr. Melissa: 57 y/o male with PMHx of DM presents to the ED c/o tachycardia to 170s. States yesterday was eating a slice a pizza, hr elevated. Today was getting ready to play golf, hr elevated. States he went to , was told his hr was very high, sent to ED. States his hr goes from 170s to 94 when going from sitting to standing. Drinks 1-2 cups of coffee a day. Denies dizziness, cp, leg pain, any other symptoms. Former smoker. Will send to main for further evaluation.

## 2024-05-14 NOTE — ED ADULT TRIAGE NOTE - CHIEF COMPLAINT QUOTE
Pt presents to ED c/o palpitations and dizziness. Pt states he was sent from [facility] where they did an EKG on him and showed his HR was in the 170s. Pt HR in triage 179. Denies CP, SOB, or any other  complaints Denies cardiac pmh. Taken for EKG.

## 2024-05-14 NOTE — ED ADULT NURSE REASSESSMENT NOTE - NS ED NURSE REASSESS COMMENT FT1
Pt heart rate up to 168 in the ED. Pt placed on zoll pads and given 6 of adenosine per MAR with MD Alston at bedside. Pt heart rate lowered to 104 in Sinus Rhythm.

## 2024-05-15 ENCOUNTER — TRANSCRIPTION ENCOUNTER (OUTPATIENT)
Age: 59
End: 2024-05-15

## 2024-05-15 VITALS
DIASTOLIC BLOOD PRESSURE: 81 MMHG | SYSTOLIC BLOOD PRESSURE: 132 MMHG | TEMPERATURE: 98 F | RESPIRATION RATE: 16 BRPM | HEART RATE: 85 BPM | OXYGEN SATURATION: 98 %

## 2024-05-15 DIAGNOSIS — I47.10 SUPRAVENTRICULAR TACHYCARDIA, UNSPECIFIED: ICD-10-CM

## 2024-05-15 LAB
ABO RH CONFIRMATION: SIGNIFICANT CHANGE UP
ANION GAP SERPL CALC-SCNC: 6 MMOL/L — SIGNIFICANT CHANGE UP (ref 5–17)
BASOPHILS # BLD AUTO: 0.03 K/UL — SIGNIFICANT CHANGE UP (ref 0–0.2)
BASOPHILS NFR BLD AUTO: 0.4 % — SIGNIFICANT CHANGE UP (ref 0–2)
BUN SERPL-MCNC: 18 MG/DL — SIGNIFICANT CHANGE UP (ref 7–23)
CALCIUM SERPL-MCNC: 8.6 MG/DL — SIGNIFICANT CHANGE UP (ref 8.5–10.1)
CHLORIDE SERPL-SCNC: 112 MMOL/L — HIGH (ref 96–108)
CO2 SERPL-SCNC: 22 MMOL/L — SIGNIFICANT CHANGE UP (ref 22–31)
CREAT SERPL-MCNC: 1.16 MG/DL — SIGNIFICANT CHANGE UP (ref 0.5–1.3)
EGFR: 73 ML/MIN/1.73M2 — SIGNIFICANT CHANGE UP
EOSINOPHIL # BLD AUTO: 0.1 K/UL — SIGNIFICANT CHANGE UP (ref 0–0.5)
EOSINOPHIL NFR BLD AUTO: 1.4 % — SIGNIFICANT CHANGE UP (ref 0–6)
GLUCOSE SERPL-MCNC: 89 MG/DL — SIGNIFICANT CHANGE UP (ref 70–99)
HCT VFR BLD CALC: 42.1 % — SIGNIFICANT CHANGE UP (ref 39–50)
HGB BLD-MCNC: 14.6 G/DL — SIGNIFICANT CHANGE UP (ref 13–17)
IMM GRANULOCYTES NFR BLD AUTO: 0.1 % — SIGNIFICANT CHANGE UP (ref 0–0.9)
LYMPHOCYTES # BLD AUTO: 2.14 K/UL — SIGNIFICANT CHANGE UP (ref 1–3.3)
LYMPHOCYTES # BLD AUTO: 30.3 % — SIGNIFICANT CHANGE UP (ref 13–44)
MCHC RBC-ENTMCNC: 30.4 PG — SIGNIFICANT CHANGE UP (ref 27–34)
MCHC RBC-ENTMCNC: 34.7 GM/DL — SIGNIFICANT CHANGE UP (ref 32–36)
MCV RBC AUTO: 87.7 FL — SIGNIFICANT CHANGE UP (ref 80–100)
MONOCYTES # BLD AUTO: 0.9 K/UL — SIGNIFICANT CHANGE UP (ref 0–0.9)
MONOCYTES NFR BLD AUTO: 12.7 % — SIGNIFICANT CHANGE UP (ref 2–14)
NEUTROPHILS # BLD AUTO: 3.88 K/UL — SIGNIFICANT CHANGE UP (ref 1.8–7.4)
NEUTROPHILS NFR BLD AUTO: 55.1 % — SIGNIFICANT CHANGE UP (ref 43–77)
PLATELET # BLD AUTO: 225 K/UL — SIGNIFICANT CHANGE UP (ref 150–400)
POTASSIUM SERPL-MCNC: 3.7 MMOL/L — SIGNIFICANT CHANGE UP (ref 3.5–5.3)
POTASSIUM SERPL-SCNC: 3.7 MMOL/L — SIGNIFICANT CHANGE UP (ref 3.5–5.3)
RBC # BLD: 4.8 M/UL — SIGNIFICANT CHANGE UP (ref 4.2–5.8)
RBC # FLD: 12.4 % — SIGNIFICANT CHANGE UP (ref 10.3–14.5)
SODIUM SERPL-SCNC: 140 MMOL/L — SIGNIFICANT CHANGE UP (ref 135–145)
WBC # BLD: 7.06 K/UL — SIGNIFICANT CHANGE UP (ref 3.8–10.5)
WBC # FLD AUTO: 7.06 K/UL — SIGNIFICANT CHANGE UP (ref 3.8–10.5)

## 2024-05-15 PROCEDURE — 36415 COLL VENOUS BLD VENIPUNCTURE: CPT

## 2024-05-15 PROCEDURE — 85025 COMPLETE CBC W/AUTO DIFF WBC: CPT

## 2024-05-15 PROCEDURE — 80048 BASIC METABOLIC PNL TOTAL CA: CPT

## 2024-05-15 PROCEDURE — 99236 HOSP IP/OBS SAME DATE HI 85: CPT

## 2024-05-15 PROCEDURE — 99223 1ST HOSP IP/OBS HIGH 75: CPT

## 2024-05-15 RX ORDER — ENOXAPARIN SODIUM 100 MG/ML
40 INJECTION SUBCUTANEOUS EVERY 24 HOURS
Refills: 0 | Status: DISCONTINUED | OUTPATIENT
Start: 2024-05-15 | End: 2024-05-15

## 2024-05-15 RX ORDER — TIRZEPATIDE 15 MG/.5ML
15 INJECTION, SOLUTION SUBCUTANEOUS
Refills: 0 | DISCHARGE

## 2024-05-15 RX ORDER — ATORVASTATIN CALCIUM 80 MG/1
20 TABLET, FILM COATED ORAL AT BEDTIME
Refills: 0 | Status: DISCONTINUED | OUTPATIENT
Start: 2024-05-15 | End: 2024-05-15

## 2024-05-15 RX ORDER — SODIUM CHLORIDE 9 MG/ML
1000 INJECTION INTRAMUSCULAR; INTRAVENOUS; SUBCUTANEOUS
Refills: 0 | Status: DISCONTINUED | OUTPATIENT
Start: 2024-05-15 | End: 2024-05-15

## 2024-05-15 RX ORDER — ATORVASTATIN CALCIUM 80 MG/1
1 TABLET, FILM COATED ORAL
Refills: 0 | DISCHARGE

## 2024-05-15 RX ORDER — METFORMIN HYDROCHLORIDE 850 MG/1
1000 TABLET ORAL
Refills: 0 | Status: DISCONTINUED | OUTPATIENT
Start: 2024-05-15 | End: 2024-05-15

## 2024-05-15 RX ORDER — METFORMIN HYDROCHLORIDE 850 MG/1
2 TABLET ORAL
Refills: 0 | DISCHARGE

## 2024-05-15 RX ORDER — LANOLIN ALCOHOL/MO/W.PET/CERES
3 CREAM (GRAM) TOPICAL AT BEDTIME
Refills: 0 | Status: DISCONTINUED | OUTPATIENT
Start: 2024-05-15 | End: 2024-05-15

## 2024-05-15 RX ORDER — METOPROLOL TARTRATE 50 MG
0.5 TABLET ORAL
Qty: 30 | Refills: 0
Start: 2024-05-15 | End: 2024-06-13

## 2024-05-15 RX ORDER — ACETAMINOPHEN 500 MG
650 TABLET ORAL EVERY 6 HOURS
Refills: 0 | Status: DISCONTINUED | OUTPATIENT
Start: 2024-05-15 | End: 2024-05-15

## 2024-05-15 RX ORDER — ONDANSETRON 8 MG/1
4 TABLET, FILM COATED ORAL EVERY 8 HOURS
Refills: 0 | Status: DISCONTINUED | OUTPATIENT
Start: 2024-05-15 | End: 2024-05-15

## 2024-05-15 RX ADMIN — METFORMIN HYDROCHLORIDE 1000 MILLIGRAM(S): 850 TABLET ORAL at 07:23

## 2024-05-15 RX ADMIN — SODIUM CHLORIDE 75 MILLILITER(S): 9 INJECTION INTRAMUSCULAR; INTRAVENOUS; SUBCUTANEOUS at 06:23

## 2024-05-15 NOTE — ED PROVIDER NOTE - CLINICAL SUMMARY MEDICAL DECISION MAKING FREE TEXT BOX
patient's EKG from 2155 showed SVT with a rate of 160.  Nail numerous attempted without success.  Patient given adenosine 6 mg IV which converted him to sinus tachycardia.  Patient tolerated procedure well.  Chest x-ray shows no acute actionable findings.  Labs show troponin within normal limits, D-dimer within normal limits, no significant leukocytosis.  Patient admitted to Dr. Patterson hospitalist for SVT

## 2024-05-15 NOTE — DISCHARGE NOTE NURSING/CASE MANAGEMENT/SOCIAL WORK - NSDCPEFALRISK_GEN_ALL_CORE
For information on Fall & Injury Prevention, visit: [email or web address] OR  [email or web address] OR  https://www.cdc.gov/steadi/patient.html

## 2024-05-15 NOTE — ED ADULT NURSE REASSESSMENT NOTE - NS ED NURSE REASSESS COMMENT FT1
Pt resting comfortably in stretcher, bed locked in lowest position, call bell within reach. Pt has no complaints at this time, awaiting bed upstairs and to be seen by cardiology. VSS.

## 2024-05-15 NOTE — ED PROVIDER NOTE - PHYSICAL EXAMINATION
Constitutional: well appearing, NAD AAOx3  Eyes: EOMI, PERRL  Head: Normocephalic atraumatic  Mouth: no airway obstruction, posterior oropharynx clear without erythema or exudate  Neck: supple  Cardiac:  Tachycardic, regular rhythm, no MRG  Resp: Lungs CTAB  GI: Abd s/nt/nd  Neuro: CN2-12 intact, strength 5/5x4, sensation grossly intact  Skin: No rashes

## 2024-05-15 NOTE — H&P ADULT - NSCORESITESY/N_GEN_A_CORE_RD
Refill approved for 3 month.  Tianna Hensley needs to keep her scheduled appointment before further refills are given.     Yes

## 2024-05-15 NOTE — DISCHARGE NOTE PROVIDER - HOSPITAL COURSE
57 y/o male with a PMHx of DM, HLD presents to the ED for tachycardia.   He says that one day ago his Apple Watch alerted him that his heart rate was elevated,   today he got another alert and felt some palpitations so he came to the ED.   He has a Hx of DM on metformin and is on Mounjaro and has lost significant amount of weight over the past year.        Paroxysmal SVT (supraventricular tachycardia).   -terminated w/ adenosine 6 mg IV in ED w/ no recurrence.  -possible brief episodes in the past.    -recommend metoprolol tartrate 12.5 mg po BID  -Followup w/ electrophysiology/ cardio in 1 week  pt states he will follow w/ [facility], gave my my card.  -OK to d/c from cardiac standpoint.

## 2024-05-15 NOTE — DISCHARGE NOTE NURSING/CASE MANAGEMENT/SOCIAL WORK - PATIENT PORTAL LINK FT
You can access the FollowMyHealth Patient Portal offered by [facility] by registering at the following website: [email or web address]. By joining [employer]’s FollowMyHealth portal, you will also be able to view your health information using other applications (apps) compatible with our system.

## 2024-05-15 NOTE — DISCHARGE NOTE PROVIDER - PROVIDER TOKENS
FREE:[LAST:[[facility] Cardiology],PHONE:[(   )    -],FAX:[(   )    -],FOLLOWUP:[1 week]]

## 2024-05-15 NOTE — DISCHARGE NOTE PROVIDER - NSDCMRMEDTOKEN_GEN_ALL_CORE_FT
Aleve 220 mg oral tablet: 1 tab(s) orally 1 to 2 times a day as needed for pain  atorvastatin 20 mg oral tablet: 1 tab(s) orally once a day  metFORMIN 500 mg oral tablet, extended release: 2 tab(s) orally 2 times a day  Metoprolol Tartrate 25 mg oral tablet: 0.5 tab(s) orally 2 times a day  Mounjaro 15 mg/0.5 mL subcutaneous solution: 15 milligram(s) subcutaneously once a week on Mondays

## 2024-05-15 NOTE — H&P ADULT - ASSESSMENT
59 y/o male presents with palpitations:    #SVT  Patient had an episode of SVT while in the ED, alerted by his Apple Watch previously today and one day before for tachycardia  No Hx of afib  telemetry monitoring  cardiology consult     #DM  c/w metformin 1000 BID    #YOJANA  Patient endorses dehydration  c/w IVF

## 2024-05-15 NOTE — ED ADULT NURSE REASSESSMENT NOTE - NS ED NURSE REASSESS COMMENT FT1
received pt from RIGO Abel. pt resting comfortably at this time. no signs of distress noted. VSS as charted. no further complaints or discomforts reported at this time. safety and comfort maintained.

## 2024-05-15 NOTE — CONSULT NOTE ADULT - SUBJECTIVE AND OBJECTIVE BOX
CHIEF COMPLAINT:    HPI:  59 y/o male with a PMHx of DM, HLD presents to the ED for tachycardia.   He says that one day ago his Apple Watch alerted him that his heart rate was elevated,   today he got another alert and felt some palpitations so he came to the ED.   He has a Hx of DM on metformin and is on Mounjaro and has lost significant amount of weight over the past year.   He denies feeling sick in the past week, no sick contacts,   has never had palpitations before, denies chest pain, sob, n/v/d/c, headache, dizziness, focal neurological findings.  (15 May 2024 03:12)    Consulted for SVT. Reviewed tele & ECGs.  SVT in 180s noted.  adenosine 6 mg IV x1 given  & SVT terminated. No recurrence in ED.  On questioning possible brief episodes in the past.      PAST MEDICAL AND SURGICAL HISTORY:  PAST MEDICAL & SURGICAL HISTORY:  DM (diabetes mellitus)          ALLERGIES:  Allergies    No Known Allergies    Intolerances        SOCIAL HISTORY:  Social History:      FAMILY  HISTORY:  FAMILY HISTORY:      MEDICATIONS:  OUTPATIENT:  Home Medications:  Aleve 220 mg oral tablet: 1 tab(s) orally 1 to 2 times a day as needed for pain (15 May 2024 00:42)  atorvastatin 20 mg oral tablet: 1 tab(s) orally once a day (15 May 2024 00:42)  metFORMIN 500 mg oral tablet, extended release: 2 tab(s) orally 2 times a day (15 May 2024 00:42)  Mounjaro 15 mg/0.5 mL subcutaneous solution: 15 milligram(s) subcutaneously once a week on Mondays (15 May 2024 00:42)      INPATIENT:  MEDICATIONS  (STANDING):  atorvastatin 20 milliGRAM(s) Oral at bedtime  enoxaparin Injectable 40 milliGRAM(s) SubCutaneous every 24 hours  metFORMIN 1000 milliGRAM(s) Oral two times a day with meals  sodium chloride 0.9%. 1000 milliLiter(s) (75 mL/Hr) IV Continuous <Continuous>    MEDICATIONS  (PRN):  acetaminophen     Tablet .. 650 milliGRAM(s) Oral every 6 hours PRN Temp greater or equal to 38C (100.4F), Mild Pain (1 - 3)  aluminum hydroxide/magnesium hydroxide/simethicone Suspension 30 milliLiter(s) Oral every 4 hours PRN Dyspepsia  melatonin 3 milliGRAM(s) Oral at bedtime PRN Insomnia  ondansetron Injectable 4 milliGRAM(s) IV Push every 8 hours PRN Nausea and/or Vomiting    MEDICATIONS  (PRN):  acetaminophen     Tablet .. 650 milliGRAM(s) Oral every 6 hours PRN Temp greater or equal to 38C (100.4F), Mild Pain (1 - 3)  aluminum hydroxide/magnesium hydroxide/simethicone Suspension 30 milliLiter(s) Oral every 4 hours PRN Dyspepsia  melatonin 3 milliGRAM(s) Oral at bedtime PRN Insomnia  ondansetron Injectable 4 milliGRAM(s) IV Push every 8 hours PRN Nausea and/or Vomiting      REVIEW OF SYSTEMS:  ===============================  ===============================  CONSTITUTIONAL: No weakness, fevers or chills  EYES/ENT: No visual changes;  No vertigo or throat pain   NECK: No pain or stiffness  RESPIRATORY: No cough, wheezing, hemoptysis; No shortness of breath  CARDIOVASCULAR: No chest pain or palpitations  GASTROINTESTINAL: No abdominal or epigastric pain. No nausea, vomiting, or hematemesis;   No diarrhea or constipation. No melena or hematochezia.  GENITOURINARY: No dysuria, frequency or hematuria  NEUROLOGICAL: No numbness or weakness  SKIN: No itching, burning, rashes, or lesions   All other review of systems is negative unless indicated above    Vital Signs Last 24 Hrs  T(C): 36.7 (15 May 2024 10:35), Max: 36.8 (15 May 2024 00:42)  T(F): 98 (15 May 2024 10:35), Max: 98.2 (15 May 2024 00:42)  HR: 78 (15 May 2024 10:35) (78 - 166)  BP: 126/83 (15 May 2024 10:35) (112/71 - 134/93)  BP(mean): 96 (15 May 2024 10:35) (85 - 102)  RR: 14 (15 May 2024 10:35) (14 - 22)  SpO2: 97% (15 May 2024 10:35) (97% - 100%)    Parameters below as of 15 May 2024 10:35  Patient On (Oxygen Delivery Method): room air        I&O's Summary      I&O's Detail      PHYSICAL EXAM:    Constitutional: NAD, awake and alert, well-developed  HEENT: PERR, EOMI,  No oral cyananosis.  Neck:  supple,  No JVD  Respiratory: Breath sounds are clear bilaterally, No wheezing, rales or rhonchi  Cardiovascular: S1 and S2, regular rate and rhythm, no Murmurs, gallops or rubs  Gastrointestinal: Bowel Sounds present, soft, nontender.   Extremities: No peripheral edema. No clubbing or cyanosis.  Vascular: 2+ peripheral pulses  Neurological: A/O x 3, no focal deficits  Musculoskeletal: no calf tenderness.  Skin: No rashes.    ===============================  ===============================  LABS:                         14.6   7.06  )-----------( 225      ( 15 May 2024 06:43 )             42.1                         15.6   11.18 )-----------( 235      ( 14 May 2024 21:45 )             44.4     15 May 2024 06:43    140    |  112    |  18     ----------------------------<  89     3.7     |  22     |  1.16   14 May 2024 21:45    140    |  112    |  21     ----------------------------<  107    3.6     |  20     |  1.35     Ca    8.6        15 May 2024 06:43  Ca    8.9        14 May 2024 21:45    TPro  6.7    /  Alb  3.6    /  TBili  0.6    /  DBili  x      /  AST  22     /  ALT  26     /  AlkPhos  66     14 May 2024 21:45    PT/INR - ( 14 May 2024 21:45 )   PT: 10.6 sec;   INR: 0.94 ratio         PTT - ( 14 May 2024 21:45 )  PTT:29.0 sec    THYROID STUDIES:    ===============================  ===============================  CARDIAC BIOMARKERS:  -------  -BNP VALUES:    -------  -TROPONIN VALUES:   Troponin I, High Sensitivity Result: 16.36 ng/L (05-14-24 @ 21:45)      ===============================  ===============================  EKG:  NSR no ischemic changes    SVT 160s on prior ECG no ischemic changes.

## 2024-05-15 NOTE — H&P ADULT - HISTORY OF PRESENT ILLNESS
59 y/o male with a PMHx of DM, HLD presents to the ED for tachycardia. He says that one day ago his Apple Watch alerted him that his heart rate was elevated, today he got another alert and felt some palpitations so he came to the ED. He has a Hx of DM on metformin and is on Mounjaro and has lost significant amount of weight over the past year. He denies feeling sick in the past week, no sick contacts, has never had palpitations before, denies chest pain, sob, n/v/d/c, headache, dizziness, focal neurological findings.

## 2024-05-15 NOTE — ED PROVIDER NOTE - OBJECTIVE STATEMENT
58-year-old male past medical history of diabetes presents today with intermittent palpitations for the last 2 days.  Patient states that his Apple Watch states he is heart rate has been as high as 170s.  States that it usually happens when he is sitting down, and then improves when he is walking around.  Patient denies chest pain, difficulty breathing, leg pain or swelling, fevers chills nausea vomiting abdominal pain, urinary symptoms, diarrhea, black bloody stools.

## 2024-05-15 NOTE — DISCHARGE NOTE PROVIDER - NSDCCPCAREPLAN_GEN_ALL_CORE_FT
PRINCIPAL DISCHARGE DIAGNOSIS  Diagnosis: Paroxysmal SVT (supraventricular tachycardia)  Assessment and Plan of Treatment: f/up with [facility] cardiology

## 2024-05-15 NOTE — CONSULT NOTE ADULT - PROBLEM SELECTOR RECOMMENDATION 9
-terminated w/ adenosine 6 mg IV in ED w/ no recurrence.  -possible brief episodes in the past.    -recommend metoprolol tartrate 12.5 mg po BID  -Followup w/ electrophysiology/ cardio in 1 week  pt states he will follow w/ [facility], gave my my card.  -OK to d/c from cardiac standpoint.

## 2024-05-22 DIAGNOSIS — E78.5 HYPERLIPIDEMIA, UNSPECIFIED: ICD-10-CM

## 2024-05-22 DIAGNOSIS — I47.10 SUPRAVENTRICULAR TACHYCARDIA, UNSPECIFIED: ICD-10-CM

## 2024-05-22 DIAGNOSIS — N17.9 ACUTE KIDNEY FAILURE, UNSPECIFIED: ICD-10-CM

## 2024-05-22 DIAGNOSIS — Z79.84 LONG TERM (CURRENT) USE OF ORAL HYPOGLYCEMIC DRUGS: ICD-10-CM

## 2024-05-22 DIAGNOSIS — Z87.891 PERSONAL HISTORY OF NICOTINE DEPENDENCE: ICD-10-CM

## 2024-05-22 DIAGNOSIS — E11.9 TYPE 2 DIABETES MELLITUS WITHOUT COMPLICATIONS: ICD-10-CM

## 2024-08-12 ENCOUNTER — APPOINTMENT (OUTPATIENT)
Dept: ORTHOPEDIC SURGERY | Facility: CLINIC | Age: 59
End: 2024-08-12

## 2024-08-12 DIAGNOSIS — M19.011 PRIMARY OSTEOARTHRITIS, RIGHT SHOULDER: ICD-10-CM

## 2024-08-12 DIAGNOSIS — M75.41 IMPINGEMENT SYNDROME OF RIGHT SHOULDER: ICD-10-CM

## 2024-08-12 PROCEDURE — 20611 DRAIN/INJ JOINT/BURSA W/US: CPT | Mod: 50

## 2024-08-12 NOTE — HISTORY OF PRESENT ILLNESS
[de-identified] : 8/12/24: Here for follow up. Continues to have pain in right shoulder. Also reports occasional pain in left shoulder as well  07/27/23: [facility] is presenting today for a follow up for RT Shoulder Pain Improves With: CSI  Worse With: Activities/Sleeping Quality of Symptoms: Throbbing/Achy Treatment: CSI and PT Additional Information: A1C - 5, Diabetes controlled with medication   [FreeTextEntry1] : [place]

## 2024-08-12 NOTE — DISCUSSION/SUMMARY
[de-identified] : Assessment: The patient is a 57 year old male with complete tear of right rotator cuff and arthritis of glenohumeral joint..  Patient and I discussed their symptoms. Discussed findings of today's exam and possible causes of patient's pain. Educated patient on their most probable diagnosis. Reviewed possible courses of treatment, and we collaboratively decided best course of treatment at this time will include:  1. Previously discussed planning RT shoulder replacement, however, patient is doing well. ROM and pain improving. Will continue with nonoperative treatment at this time 2. Apply ice to effected area, as needed 3. CSI Injection was given today in B/L shoulders in office, patient tolerated injection well 4. Follow up prn

## 2024-08-12 NOTE — IMAGING
[de-identified] : The patient is a well appearing 57 year male of their stated age.\par  Neck is supple & nontender to palpation. Negative Spurling's test.\par  \par  General: in no acute distress, seated comfortably, moving easily\par  Skin: No discoloration, rashes; on palpation skin is dry, \par  Neuro: Normal sensation all dermatomes, motor all myotomes\par  Vascular: Normal pulses, no edema, normal temperature\par  Coordination and balance: Normal\par  Psych: normal mood and affect, non pressured speech, alert and oriented x3\par  \par  RIGHT Shoulder \par  Inspection:\par  Scapula Winging: Negative\par  Deformity: None\par  Erythema: None\par  Ecchymosis: None\par  Abrasions: None\par  Effusion: None\par  \par  Range of Motion:\par  Active Forward Flexion: 120 degrees \par  Passive Forward Flexion: 170 degrees \par  Active IR : sacrum \par  Passive ER : 15 degrees\par  \par  Motor Exam:\par  Forward Flexion: 4+ out of 5\par  Flexion Plane of Scapula: 5 out of 5\par  Abduction: 4+ out of 5\par  Internal Rotation: 5 out of 5\par  External Rotation: 4+ out of 5\par  Distal Motor Strength: 5 out of 5\par  \par  Stability Testing:\par  Anterior: 1+\par  Posterior: 1+\par  Sulcus N: 1+\par  Sulcus ER: 1+\par  \par  Provocative Tests:\par  Drop Arm: Negative\par  Bee/Impingement: Positive\par  [place]: Positive\par  X-Arm Adduction: Negative\par  Belly Press: Negative\par  Bear Hug: Negative\par  Lift Off: Negative\par  Apprehension: Negative\par  Relocation: Negative\par  Posterior Load & Shift: Negative\par  \par  Palpation:\par  AC Joint: Nontender\par  Clavicle: Nontender\par  SC Joint: Nontender\par  Bicepital Groove: Positive\par  Coracoid Process: Nontender\par  Pectoralis Minor Tendon: Nontender\par  Pectoralis Major Tendon: Nontender & palpably intact\par  Latissimus Dorsi: Nontender \par  Proximal Humerus: Positive\par  Scapula Body: Nontender\par  Medial Scapula Boarder: Nontender\par  Scapula Spine: Nontender\par  \par  Neurologic Exam: Sensation to Light Touch:\par  Axillary: Grossly intact\par  Ulnar: Grossly intact\par  Radial: Grossly intact\par  Median: Grossly intact\par  Other:  N/A\par  \par  Circulatory/Pulses:\par  Ulnar: 2+\par  Radial: 2+\par  Other Pertinent Findings: None\par  \par  Contralateral Shoulder\par  Range of Motion:\par  Active Forward Flexion: 180 degrees \par  Active Abduction: 180 degrees \par  Passive Forward Flexion: 180 degrees \par  Passive Abduction: 180 degrees \par  ER @ 90 degrees: 90 degrees\par  IR @ 90 degrees: 45 degrees\par  ER @ 0 degrees: 50 degrees\par  \par  Motor Exam:\par  Forward Flexion: 5 out of 5\par  Flexion Plane of Scapula: 5 out of 5\par  Abduction: 5 out of 5\par  Internal Rotation: 5 out of 5\par  External Rotation: 5 out of 5\par  Distal Motor Strength: 5 out of 5\par  \par  Stability Testing:\par  Anterior: 1+\par  Posterior: 1+\par  Sulcus N: 1+\par  Sulcus ER: 1+\par  \par  Other Pertinent Findings: None\par

## 2024-08-12 NOTE — PROCEDURE
[FreeTextEntry3] : Patient Identification  Name/: Verbal with patient and/or family    Procedure Verification:  Procedure confirmed with patient or family/designee  Consent for procedure: Verbal Consent Given  Relevant documentation completed, reviewed, and signed  Clinical indications for procedure confirmed    Time-out with all members of procedure team immediately prior to procedure:  Correct patient identified. Agreement on procedure. Correct side and site.    SHOULDER SUBACROMIAL INJECTION - BILATERAL  After verbal consent and identification of the correct patient and correct site, the posterior right and left shoulders were prepped using alcohol swabs and betadine. This was allowed time to air dry. After ethyl chloride spray for skin anesthesia, a mixture of 1cc Celestone 6mg/ml, 3cc Lidocaine 1%, and 3cc Bupivacaine 0.5% was injected under ultrasound guidance into the subacromial space of both shoulders from posterior using a sterile 22G needle. Visualization of the needle and placement of each injection was performed without any complications. The patient tolerated the procedure well. After-care instructions were provided and included instructions to ice the area and to call if redness, pain, or fever develop.

## 2024-09-06 ENCOUNTER — APPOINTMENT (OUTPATIENT)
Dept: ORTHOPEDIC SURGERY | Facility: CLINIC | Age: 59
End: 2024-09-06

## 2024-09-06 VITALS — HEIGHT: 70 IN | BODY MASS INDEX: 27.2 KG/M2 | WEIGHT: 190 LBS

## 2024-09-06 DIAGNOSIS — M19.011 PRIMARY OSTEOARTHRITIS, RIGHT SHOULDER: ICD-10-CM

## 2024-09-06 DIAGNOSIS — M19.012 PRIMARY OSTEOARTHRITIS, LEFT SHOULDER: ICD-10-CM

## 2024-09-06 DIAGNOSIS — S46.101A UNSPECIFIED INJURY OF MUSCLE, FASCIA AND TENDON OF LONG HEAD OF BICEPS, RIGHT ARM, INITIAL ENCOUNTER: ICD-10-CM

## 2024-09-06 DIAGNOSIS — M75.42 IMPINGEMENT SYNDROME OF LEFT SHOULDER: ICD-10-CM

## 2024-09-06 DIAGNOSIS — S43.431A SUPERIOR GLENOID LABRUM LESION OF RIGHT SHOULDER, INITIAL ENCOUNTER: ICD-10-CM

## 2024-09-06 DIAGNOSIS — M75.121 COMPLETE ROTATOR CUFF TEAR OR RUPTURE OF RIGHT SHOULDER, NOT SPECIFIED AS TRAUMATIC: ICD-10-CM

## 2024-09-06 DIAGNOSIS — M75.41 IMPINGEMENT SYNDROME OF RIGHT SHOULDER: ICD-10-CM

## 2024-09-06 PROCEDURE — J3490M: CUSTOM

## 2024-09-06 PROCEDURE — 20611 DRAIN/INJ JOINT/BURSA W/US: CPT | Mod: 50

## 2024-09-06 NOTE — IMAGING
[de-identified] : The patient is a well appearing 58 year male of their stated age. Neck is supple & nontender to palpation. Negative Spurling's test.  General: in no acute distress, seated comfortably, moving easily Skin: No discoloration, rashes; on palpation skin is dry,  Neuro: Normal sensation all dermatomes, motor all myotomes Vascular: Normal pulses, no edema, normal temperature Coordination and balance: Normal Psych: normal mood and affect, non pressured speech, alert and oriented x3  BILATERAL Shoulder  Inspection: Scapula Winging: Negative Deformity: None Erythema: None Ecchymosis: None Abrasions: None Effusion: None  Range of Motion: Active Forward Flexion: 120 degrees  Passive Forward Flexion: 170 degrees  Active IR : sacrum  Passive ER : 15 degrees  Motor Exam: Forward Flexion: 4+ out of 5 Flexion Plane of Scapula: 5 out of 5 Abduction: 4+ out of 5 Internal Rotation: 5 out of 5 External Rotation: 4+ out of 5 Distal Motor Strength: 5 out of 5  Stability Testing: Anterior: 1+ Posterior: 1+ Sulcus N: 1+ Sulcus ER: 1+  Provocative Tests: Drop Arm: Negative Bee/Impingement: Positive [place]: Positive X-Arm Adduction: Negative Belly Press: Negative Bear Hug: Negative Lift Off: Negative Apprehension: Negative Relocation: Negative Posterior Load & Shift: Negative  Palpation: AC Joint: Nontender Clavicle: Nontender SC Joint: Nontender Bicepital Groove: Positive Coracoid Process: Nontender Pectoralis Minor Tendon: Nontender Pectoralis Major Tendon: Nontender & palpably intact Latissimus Dorsi: Nontender  Proximal Humerus: Positive Scapula Body: Nontender Medial Scapula Boarder: Nontender Scapula Spine: Nontender  Neurologic Exam: Sensation to Light Touch: Axillary: Grossly intact Ulnar: Grossly intact Radial: Grossly intact Median: Grossly intact Other:  N/A  Circulatory/Pulses: Ulnar: 2+ Radial: 2+ Other Pertinent Findings: None

## 2024-09-06 NOTE — DISCUSSION/SUMMARY
[de-identified] : Assessment: The patient is a 58 year old male with complete tear of right rotator cuff and arthritis of glenohumeral joint..  Patient and I discussed their symptoms. Discussed findings of today's exam and possible causes of patient's pain. Educated patient on their most probable diagnosis. Reviewed possible courses of treatment, and we collaboratively decided best course of treatment at this time will include:  1. Previously discussed planning RT shoulder replacement, however, patient is doing well. ROM and pain improving. Will continue with nonoperative treatment at this time 2. Apply ice to effected area, as needed 3. CSI Injection was given today in B/L shoulders in office, patient tolerated injection well 4. Follow up prn

## 2024-09-06 NOTE — HISTORY OF PRESENT ILLNESS
[de-identified] : 09/06/2024: Pt here today for follow-up b/l shoulders. Pain and symptoms are mildly improving. Today c/o pain w/ playing pickle ball. Pt received CSI in shoulder at last visit, with excellent relief, however, continues to complain of bicep pain, is interested in CSI today for bicep.   8/12/24: Here for follow up. Continues to have pain in right shoulder. Also reports occasional pain in left shoulder as well  07/27/23: EN[facility] is presenting today for a follow up for RT Shoulder Pain Improves With: CSI  Worse With: Activities/Sleeping Quality of Symptoms: Throbbing/Achy Treatment: CSI and PT Additional Information: A1C - 5, Diabetes controlled with medication

## 2024-11-21 ENCOUNTER — APPOINTMENT (OUTPATIENT)
Dept: ORTHOPEDIC SURGERY | Facility: CLINIC | Age: 59
End: 2024-11-21

## 2024-11-21 DIAGNOSIS — M75.41 IMPINGEMENT SYNDROME OF RIGHT SHOULDER: ICD-10-CM

## 2024-11-21 DIAGNOSIS — M19.012 PRIMARY OSTEOARTHRITIS, LEFT SHOULDER: ICD-10-CM

## 2024-11-21 DIAGNOSIS — M75.42 IMPINGEMENT SYNDROME OF LEFT SHOULDER: ICD-10-CM

## 2024-11-21 DIAGNOSIS — M19.011 PRIMARY OSTEOARTHRITIS, RIGHT SHOULDER: ICD-10-CM

## 2024-11-21 DIAGNOSIS — S46.101A UNSPECIFIED INJURY OF MUSCLE, FASCIA AND TENDON OF LONG HEAD OF BICEPS, RIGHT ARM, INITIAL ENCOUNTER: ICD-10-CM

## 2024-11-21 DIAGNOSIS — S43.431A SUPERIOR GLENOID LABRUM LESION OF RIGHT SHOULDER, INITIAL ENCOUNTER: ICD-10-CM

## 2024-11-21 PROCEDURE — 73010 X-RAY EXAM OF SHOULDER BLADE: CPT | Mod: LT

## 2024-11-21 PROCEDURE — 99214 OFFICE O/P EST MOD 30 MIN: CPT | Mod: 25

## 2024-11-21 PROCEDURE — 20610 DRAIN/INJ JOINT/BURSA W/O US: CPT | Mod: LT

## 2024-11-21 PROCEDURE — 73030 X-RAY EXAM OF SHOULDER: CPT | Mod: LT

## 2024-11-21 RX ORDER — MELOXICAM 15 MG/1
15 TABLET ORAL
Qty: 30 | Refills: 0 | Status: ACTIVE | COMMUNITY
Start: 2024-11-21 | End: 1900-01-01

## 2024-12-23 ENCOUNTER — RX RENEWAL (OUTPATIENT)
Age: 59
End: 2024-12-23